# Patient Record
Sex: FEMALE | Race: WHITE | Employment: OTHER | URBAN - NONMETROPOLITAN AREA
[De-identification: names, ages, dates, MRNs, and addresses within clinical notes are randomized per-mention and may not be internally consistent; named-entity substitution may affect disease eponyms.]

---

## 2017-01-01 ENCOUNTER — APPOINTMENT (OUTPATIENT)
Dept: GENERAL RADIOLOGY | Age: 82
End: 2017-01-01
Payer: MEDICARE

## 2017-01-01 ENCOUNTER — CARE COORDINATION (OUTPATIENT)
Dept: CARE COORDINATION | Age: 82
End: 2017-01-01

## 2017-01-01 ENCOUNTER — CARE COORDINATION (OUTPATIENT)
Dept: FAMILY MEDICINE CLINIC | Age: 82
End: 2017-01-01

## 2017-01-01 ENCOUNTER — TELEPHONE (OUTPATIENT)
Dept: FAMILY MEDICINE CLINIC | Age: 82
End: 2017-01-01

## 2017-01-01 ENCOUNTER — NURSE ONLY (OUTPATIENT)
Dept: FAMILY MEDICINE CLINIC | Age: 82
End: 2017-01-01
Payer: MEDICARE

## 2017-01-01 ENCOUNTER — OFFICE VISIT (OUTPATIENT)
Dept: FAMILY MEDICINE CLINIC | Age: 82
End: 2017-01-01

## 2017-01-01 ENCOUNTER — APPOINTMENT (OUTPATIENT)
Dept: CT IMAGING | Age: 82
End: 2017-01-01
Payer: MEDICARE

## 2017-01-01 ENCOUNTER — HOSPITAL ENCOUNTER (EMERGENCY)
Age: 82
Discharge: HOME | End: 2017-08-31
Payer: MEDICARE

## 2017-01-01 ENCOUNTER — OFFICE VISIT (OUTPATIENT)
Dept: FAMILY MEDICINE CLINIC | Age: 82
End: 2017-01-01
Payer: MEDICARE

## 2017-01-01 VITALS
SYSTOLIC BLOOD PRESSURE: 134 MMHG | DIASTOLIC BLOOD PRESSURE: 78 MMHG | HEIGHT: 64 IN | HEART RATE: 72 BPM | BODY MASS INDEX: 27.11 KG/M2 | WEIGHT: 158.8 LBS

## 2017-01-01 VITALS
HEART RATE: 66 BPM | DIASTOLIC BLOOD PRESSURE: 64 MMHG | TEMPERATURE: 97.9 F | SYSTOLIC BLOOD PRESSURE: 127 MMHG | RESPIRATION RATE: 18 BRPM | OXYGEN SATURATION: 98 % | HEIGHT: 64 IN

## 2017-01-01 VITALS
HEIGHT: 63 IN | SYSTOLIC BLOOD PRESSURE: 126 MMHG | BODY MASS INDEX: 27.5 KG/M2 | WEIGHT: 155.2 LBS | HEART RATE: 72 BPM | DIASTOLIC BLOOD PRESSURE: 72 MMHG

## 2017-01-01 DIAGNOSIS — S70.02XA CONTUSION OF LEFT HIP, INITIAL ENCOUNTER: ICD-10-CM

## 2017-01-01 DIAGNOSIS — M48.02 CERVICAL SPINAL STENOSIS: ICD-10-CM
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
## 2017-01-01 DIAGNOSIS — Z23 INFLUENZA VACCINE NEEDED: ICD-10-CM

## 2017-01-01 DIAGNOSIS — I10 ESSENTIAL HYPERTENSION: Primary | ICD-10-CM

## 2017-01-01 DIAGNOSIS — I48.0 PAROXYSMAL ATRIAL FIBRILLATION (HCC): Primary | Chronic | ICD-10-CM

## 2017-01-01 DIAGNOSIS — Z79.01 LONG-TERM (CURRENT) USE OF ANTICOAGULANTS: ICD-10-CM

## 2017-01-01 DIAGNOSIS — I10 ESSENTIAL HYPERTENSION: ICD-10-CM

## 2017-01-01 DIAGNOSIS — J30.2 SEASONAL ALLERGIC RHINITIS, UNSPECIFIED ALLERGIC RHINITIS TRIGGER: Primary | Chronic | ICD-10-CM

## 2017-01-01 DIAGNOSIS — E78.00 PURE HYPERCHOLESTEROLEMIA: ICD-10-CM

## 2017-01-01 DIAGNOSIS — I48.0 PAROXYSMAL ATRIAL FIBRILLATION (HCC): Chronic | ICD-10-CM

## 2017-01-01 DIAGNOSIS — F33.42 RECURRENT MAJOR DEPRESSIVE DISORDER, IN FULL REMISSION (HCC): ICD-10-CM

## 2017-01-01 DIAGNOSIS — I48.0 PAROXYSMAL ATRIAL FIBRILLATION (HCC): Primary | ICD-10-CM

## 2017-01-01 DIAGNOSIS — Z79.01 ANTICOAGULATED ON COUMADIN: ICD-10-CM

## 2017-01-01 DIAGNOSIS — S51.002A AVULSION OF SKIN OF ELBOW, LEFT, INITIAL ENCOUNTER: ICD-10-CM

## 2017-01-01 DIAGNOSIS — I87.2 VENOUS STASIS DERMATITIS OF BOTH LOWER EXTREMITIES: ICD-10-CM

## 2017-01-01 DIAGNOSIS — F01.50 MID (MULTI INFARCT DEMENTIA), WITHOUT BEHAVIORAL DISTURBANCE (HCC): Chronic | ICD-10-CM

## 2017-01-01 LAB
EKG ATRIAL RATE: 326 BPM
EKG Q-T INTERVAL: 446 MS
EKG QRS DURATION: 76 MS
EKG QTC CALCULATION (BAZETT): 437 MS
EKG R AXIS: 2 DEGREES
EKG T AXIS: -7 DEGREES
EKG VENTRICULAR RATE: 58 BPM
INR BLD: 1
INR BLD: 1.6
INR BLD: 1.8
INR BLD: 2
INR BLD: 2.1
INR BLD: 2.2 (ref 0.85–1.13)
INR BLD: 2.38 (ref 0.85–1.13)
INR BLD: 2.38 (ref 0.85–1.13)
INR BLD: 3.5
INR BLD: 5.1
PROTHROMBIN TIME: 10.7 SEC
PROTHROMBIN TIME: 16.1 SEC
PROTHROMBIN TIME: 18.5 SEC
PROTHROMBIN TIME: 20.7 SEC
PROTHROMBIN TIME: 21.4 SEC
PROTHROMBIN TIME: 36.3 SEC
PROTHROMBIN TIME: 52.9 SEC

## 2017-01-01 PROCEDURE — 36415 COLL VENOUS BLD VENIPUNCTURE: CPT | Performed by: FAMILY MEDICINE

## 2017-01-01 PROCEDURE — 1090F PRES/ABSN URINE INCON ASSESS: CPT | Performed by: FAMILY MEDICINE

## 2017-01-01 PROCEDURE — G8420 CALC BMI NORM PARAMETERS: HCPCS | Performed by: FAMILY MEDICINE

## 2017-01-01 PROCEDURE — 1123F ACP DISCUSS/DSCN MKR DOCD: CPT | Performed by: FAMILY MEDICINE

## 2017-01-01 PROCEDURE — G8427 DOCREV CUR MEDS BY ELIG CLIN: HCPCS | Performed by: FAMILY MEDICINE

## 2017-01-01 PROCEDURE — 73080 X-RAY EXAM OF ELBOW: CPT

## 2017-01-01 PROCEDURE — 4040F PNEUMOC VAC/ADMIN/RCVD: CPT | Performed by: FAMILY MEDICINE

## 2017-01-01 PROCEDURE — 90688 IIV4 VACCINE SPLT 0.5 ML IM: CPT | Performed by: FAMILY MEDICINE

## 2017-01-01 PROCEDURE — G8484 FLU IMMUNIZE NO ADMIN: HCPCS | Performed by: FAMILY MEDICINE

## 2017-01-01 PROCEDURE — 99214 OFFICE O/P EST MOD 30 MIN: CPT | Performed by: FAMILY MEDICINE

## 2017-01-01 PROCEDURE — G8400 PT W/DXA NO RESULTS DOC: HCPCS | Performed by: FAMILY MEDICINE

## 2017-01-01 PROCEDURE — 70450 CT HEAD/BRAIN W/O DYE: CPT

## 2017-01-01 PROCEDURE — 99213 OFFICE O/P EST LOW 20 MIN: CPT | Performed by: FAMILY MEDICINE

## 2017-01-01 PROCEDURE — 73502 X-RAY EXAM HIP UNI 2-3 VIEWS: CPT

## 2017-01-01 PROCEDURE — 1036F TOBACCO NON-USER: CPT | Performed by: FAMILY MEDICINE

## 2017-01-01 PROCEDURE — 1111F DSCHRG MED/CURRENT MED MERGE: CPT | Performed by: FAMILY MEDICINE

## 2017-01-01 PROCEDURE — G0008 ADMIN INFLUENZA VIRUS VAC: HCPCS | Performed by: FAMILY MEDICINE

## 2017-01-01 PROCEDURE — 6370000000 HC RX 637 (ALT 250 FOR IP)

## 2017-01-01 PROCEDURE — G8419 CALC BMI OUT NRM PARAM NOF/U: HCPCS | Performed by: FAMILY MEDICINE

## 2017-01-01 PROCEDURE — 99284 EMERGENCY DEPT VISIT MOD MDM: CPT

## 2017-01-01 PROCEDURE — 93005 ELECTROCARDIOGRAM TRACING: CPT

## 2017-01-01 RX ORDER — WARFARIN SODIUM 2 MG/1
2 TABLET ORAL SEE ADMIN INSTRUCTIONS
Qty: 90 TABLET | Refills: 0 | Status: SHIPPED | OUTPATIENT
Start: 2017-01-01 | End: 2017-01-01 | Stop reason: SDUPTHER

## 2017-01-01 RX ORDER — WARFARIN SODIUM 1 MG/1
1 TABLET ORAL SEE ADMIN INSTRUCTIONS
Qty: 90 TABLET | Refills: 1 | Status: ON HOLD | OUTPATIENT
Start: 2017-01-01 | End: 2018-01-01

## 2017-01-01 RX ORDER — METOPROLOL TARTRATE 50 MG/1
25 TABLET, FILM COATED ORAL 2 TIMES DAILY
Qty: 90 TABLET | Refills: 1 | Status: ON HOLD | OUTPATIENT
Start: 2017-01-01 | End: 2018-01-01

## 2017-01-01 RX ORDER — WARFARIN SODIUM 1 MG/1
1 TABLET ORAL SEE ADMIN INSTRUCTIONS
Qty: 30 TABLET | Refills: 0 | Status: SHIPPED | OUTPATIENT
Start: 2017-01-01 | End: 2017-01-01 | Stop reason: SDUPTHER

## 2017-01-01 RX ORDER — WARFARIN SODIUM 2 MG/1
2 TABLET ORAL SEE ADMIN INSTRUCTIONS
Qty: 30 TABLET | Refills: 0 | Status: SHIPPED | OUTPATIENT
Start: 2017-01-01 | End: 2017-01-01 | Stop reason: SDUPTHER

## 2017-01-01 RX ORDER — LOXAPINE SUCCINATE 5 MG/1
5 TABLET ORAL NIGHTLY
Qty: 90 CAPSULE | Refills: 1 | Status: ON HOLD | OUTPATIENT
Start: 2017-01-01 | End: 2018-01-01

## 2017-01-01 RX ORDER — FLUTICASONE PROPIONATE 50 MCG
1 SPRAY, SUSPENSION (ML) NASAL NIGHTLY
Qty: 1 BOTTLE | Refills: 0 | Status: ON HOLD | OUTPATIENT
Start: 2017-01-01 | End: 2018-01-01

## 2017-01-01 RX ORDER — LOXAPINE SUCCINATE 5 MG/1
5 TABLET ORAL NIGHTLY
Qty: 90 CAPSULE | Refills: 1 | Status: SHIPPED | OUTPATIENT
Start: 2017-01-01 | End: 2017-01-01 | Stop reason: SDUPTHER

## 2017-01-01 RX ORDER — BENAZEPRIL HYDROCHLORIDE 40 MG/1
40 TABLET, FILM COATED ORAL DAILY
Qty: 90 TABLET | Refills: 1 | Status: SHIPPED | OUTPATIENT
Start: 2017-01-01 | End: 2017-01-01 | Stop reason: SDUPTHER

## 2017-01-01 RX ORDER — METOPROLOL TARTRATE 50 MG/1
25 TABLET, FILM COATED ORAL 2 TIMES DAILY
Qty: 90 TABLET | Refills: 1 | Status: SHIPPED | OUTPATIENT
Start: 2017-01-01 | End: 2017-01-01 | Stop reason: SDUPTHER

## 2017-01-01 RX ORDER — AMLODIPINE BESYLATE 5 MG/1
5 TABLET ORAL DAILY
Qty: 90 TABLET | Refills: 1 | Status: ON HOLD | OUTPATIENT
Start: 2017-01-01 | End: 2018-01-01

## 2017-01-01 RX ORDER — BENAZEPRIL HYDROCHLORIDE 40 MG/1
40 TABLET, FILM COATED ORAL DAILY
Qty: 90 TABLET | Refills: 1 | Status: ON HOLD | OUTPATIENT
Start: 2017-01-01 | End: 2018-01-01

## 2017-01-01 RX ORDER — WARFARIN SODIUM 1 MG/1
1 TABLET ORAL SEE ADMIN INSTRUCTIONS
Qty: 30 TABLET | Refills: 2 | Status: SHIPPED | OUTPATIENT
Start: 2017-01-01 | End: 2017-01-01 | Stop reason: SDUPTHER

## 2017-01-01 RX ORDER — WARFARIN SODIUM 2 MG/1
2 TABLET ORAL SEE ADMIN INSTRUCTIONS
Qty: 90 TABLET | Refills: 1 | Status: SHIPPED | OUTPATIENT
Start: 2017-01-01 | End: 2018-01-01 | Stop reason: SDUPTHER

## 2017-01-01 RX ORDER — FLUTICASONE PROPIONATE 50 MCG
1 SPRAY, SUSPENSION (ML) NASAL NIGHTLY
Qty: 1 BOTTLE | Refills: 0 | Status: SHIPPED | OUTPATIENT
Start: 2017-01-01 | End: 2017-01-01 | Stop reason: SDUPTHER

## 2017-01-01 RX ORDER — AMLODIPINE BESYLATE 5 MG/1
5 TABLET ORAL DAILY
Qty: 90 TABLET | Refills: 1 | Status: SHIPPED | OUTPATIENT
Start: 2017-01-01 | End: 2017-01-01 | Stop reason: SDUPTHER

## 2017-01-01 RX ORDER — BACITRACIN, NEOMYCIN, POLYMYXIN B 400; 3.5; 5 [USP'U]/G; MG/G; [USP'U]/G
OINTMENT TOPICAL
Status: COMPLETED
Start: 2017-01-01 | End: 2017-01-01

## 2017-01-01 RX ADMIN — NEOMYCIN AND POLYMYXIN B SULFATES AND BACITRACIN ZINC: 400; 3.5; 5 OINTMENT TOPICAL at 07:50

## 2017-01-01 ASSESSMENT — ENCOUNTER SYMPTOMS
BLOOD IN STOOL: 0
BACK PAIN: 0
COLOR CHANGE: 0
ABDOMINAL DISTENTION: 0
WHEEZING: 0
SINUS PRESSURE: 0
EYE REDNESS: 0
RHINORRHEA: 0
PHOTOPHOBIA: 0
VOICE CHANGE: 0
CHEST TIGHTNESS: 0
VOMITING: 0
DIARRHEA: 0
SHORTNESS OF BREATH: 0
NAUSEA: 0
SHORTNESS OF BREATH: 0
ABDOMINAL PAIN: 0
SORE THROAT: 0
COUGH: 0
CONSTIPATION: 0
WHEEZING: 0

## 2017-01-24 LAB
INR BLD: 2.4
PROTHROMBIN TIME: 25.1 SEC

## 2017-01-25 RX ORDER — WARFARIN SODIUM 1 MG/1
0.5 TABLET ORAL DAILY
Qty: 45 TABLET | Refills: 1 | Status: ON HOLD | OUTPATIENT
Start: 2017-01-25 | End: 2017-03-13

## 2017-01-26 ENCOUNTER — CARE COORDINATION (OUTPATIENT)
Dept: CARE COORDINATION | Age: 82
End: 2017-01-26

## 2017-02-23 ENCOUNTER — CARE COORDINATION (OUTPATIENT)
Dept: CARE COORDINATION | Age: 82
End: 2017-02-23

## 2017-02-28 LAB
INR BLD: 1.9
PROTHROMBIN TIME: 19.3 SEC

## 2017-03-12 PROBLEM — I95.9 HYPOTENSION: Status: ACTIVE | Noted: 2017-03-12

## 2017-03-12 PROBLEM — E87.1 HYPONATREMIA: Status: ACTIVE | Noted: 2017-03-12

## 2017-03-12 PROBLEM — R31.9 HEMATURIA: Status: ACTIVE | Noted: 2017-03-12

## 2017-03-12 PROBLEM — I48.92 ATRIAL FLUTTER (HCC): Status: ACTIVE | Noted: 2017-03-12

## 2017-03-12 PROBLEM — K85.90 ACUTE PANCREATITIS: Status: ACTIVE | Noted: 2017-03-12

## 2017-03-14 PROBLEM — I48.3 TYPICAL ATRIAL FLUTTER (HCC): Status: ACTIVE | Noted: 2017-03-12

## 2017-03-16 ENCOUNTER — CARE COORDINATION (OUTPATIENT)
Dept: CARE COORDINATION | Age: 82
End: 2017-03-16

## 2017-03-16 PROBLEM — I48.3 TYPICAL ATRIAL FLUTTER (HCC): Status: RESOLVED | Noted: 2017-03-12 | Resolved: 2017-03-16

## 2017-03-16 PROBLEM — E87.1 HYPONATREMIA: Status: RESOLVED | Noted: 2017-03-12 | Resolved: 2017-03-16

## 2017-03-16 PROBLEM — R31.9 HEMATURIA: Status: RESOLVED | Noted: 2017-03-12 | Resolved: 2017-03-16

## 2017-03-16 PROBLEM — I95.9 HYPOTENSION: Status: RESOLVED | Noted: 2017-03-12 | Resolved: 2017-03-16

## 2017-10-11 NOTE — PROGRESS NOTES
Frankie Ceron comes in for Nurse/CMA visit for seasonal flu vaccine. Administered in right deltoid by Octaviano Rosario LPN. Patient tolerated well. Advised to call office with any delayed reaction.
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
## 2018-01-01 ENCOUNTER — APPOINTMENT (OUTPATIENT)
Dept: GENERAL RADIOLOGY | Age: 83
DRG: 193 | End: 2018-01-01
Payer: MEDICARE

## 2018-01-01 ENCOUNTER — APPOINTMENT (OUTPATIENT)
Dept: MRI IMAGING | Age: 83
DRG: 871 | End: 2018-01-01
Payer: MEDICARE

## 2018-01-01 ENCOUNTER — OFFICE VISIT (OUTPATIENT)
Dept: FAMILY MEDICINE CLINIC | Age: 83
End: 2018-01-01
Payer: MEDICARE

## 2018-01-01 ENCOUNTER — APPOINTMENT (OUTPATIENT)
Dept: CT IMAGING | Age: 83
DRG: 193 | End: 2018-01-01
Payer: MEDICARE

## 2018-01-01 ENCOUNTER — HOSPITAL ENCOUNTER (OUTPATIENT)
Age: 83
Setting detail: SPECIMEN
Discharge: HOME | End: 2018-02-28
Payer: MEDICARE

## 2018-01-01 ENCOUNTER — CARE COORDINATION (OUTPATIENT)
Dept: CASE MANAGEMENT | Age: 83
End: 2018-01-01

## 2018-01-01 ENCOUNTER — HOSPITAL ENCOUNTER (INPATIENT)
Age: 83
LOS: 3 days | Discharge: HOSPICE | DRG: 871 | End: 2018-03-16
Attending: FAMILY MEDICINE | Admitting: HOSPITALIST
Payer: MEDICARE

## 2018-01-01 ENCOUNTER — APPOINTMENT (OUTPATIENT)
Dept: MRI IMAGING | Age: 83
DRG: 193 | End: 2018-01-01
Payer: MEDICARE

## 2018-01-01 ENCOUNTER — CARE COORDINATION (OUTPATIENT)
Dept: CARE COORDINATION | Age: 83
End: 2018-01-01

## 2018-01-01 ENCOUNTER — HOSPITAL ENCOUNTER (INPATIENT)
Age: 83
LOS: 5 days | Discharge: NURSING, REHAB OR LONG-TERM CARE | DRG: 193 | End: 2018-02-08
Attending: INTERNAL MEDICINE | Admitting: INTERNAL MEDICINE
Payer: MEDICARE

## 2018-01-01 ENCOUNTER — ANTI-COAG VISIT (OUTPATIENT)
Dept: FAMILY MEDICINE CLINIC | Age: 83
End: 2018-01-01
Payer: MEDICARE

## 2018-01-01 ENCOUNTER — HOSPITAL ENCOUNTER (OUTPATIENT)
Age: 83
End: 2018-01-01

## 2018-01-01 ENCOUNTER — NURSE ONLY (OUTPATIENT)
Dept: FAMILY MEDICINE CLINIC | Age: 83
End: 2018-01-01
Payer: MEDICARE

## 2018-01-01 ENCOUNTER — HOSPITAL ENCOUNTER (OUTPATIENT)
Age: 83
Setting detail: SPECIMEN
Discharge: HOME | End: 2018-03-05
Payer: MEDICARE

## 2018-01-01 ENCOUNTER — APPOINTMENT (OUTPATIENT)
Dept: CT IMAGING | Age: 83
DRG: 871 | End: 2018-01-01
Payer: MEDICARE

## 2018-01-01 ENCOUNTER — APPOINTMENT (OUTPATIENT)
Dept: GENERAL RADIOLOGY | Age: 83
DRG: 871 | End: 2018-01-01
Payer: MEDICARE

## 2018-01-01 ENCOUNTER — HOSPITAL ENCOUNTER (INPATIENT)
Age: 83
LOS: 1 days | DRG: 189 | End: 2018-03-17
Attending: FAMILY MEDICINE | Admitting: FAMILY MEDICINE
Payer: COMMERCIAL

## 2018-01-01 VITALS
WEIGHT: 154 LBS | DIASTOLIC BLOOD PRESSURE: 60 MMHG | SYSTOLIC BLOOD PRESSURE: 96 MMHG | BODY MASS INDEX: 26.29 KG/M2 | HEART RATE: 70 BPM | TEMPERATURE: 97.9 F | HEIGHT: 64 IN

## 2018-01-01 VITALS
HEIGHT: 63 IN | HEART RATE: 54 BPM | BODY MASS INDEX: 27.29 KG/M2 | DIASTOLIC BLOOD PRESSURE: 80 MMHG | SYSTOLIC BLOOD PRESSURE: 130 MMHG | WEIGHT: 154 LBS

## 2018-01-01 VITALS
RESPIRATION RATE: 24 BRPM | WEIGHT: 169.1 LBS | SYSTOLIC BLOOD PRESSURE: 129 MMHG | OXYGEN SATURATION: 92 % | BODY MASS INDEX: 29.96 KG/M2 | TEMPERATURE: 98.7 F | HEIGHT: 63 IN | DIASTOLIC BLOOD PRESSURE: 86 MMHG | HEART RATE: 103 BPM

## 2018-01-01 VITALS
TEMPERATURE: 99.6 F | DIASTOLIC BLOOD PRESSURE: 66 MMHG | BODY MASS INDEX: 29.96 KG/M2 | HEIGHT: 63 IN | SYSTOLIC BLOOD PRESSURE: 101 MMHG | HEART RATE: 104 BPM | OXYGEN SATURATION: 97 % | RESPIRATION RATE: 24 BRPM | WEIGHT: 169.1 LBS

## 2018-01-01 VITALS
RESPIRATION RATE: 18 BRPM | BODY MASS INDEX: 28.6 KG/M2 | WEIGHT: 161.4 LBS | OXYGEN SATURATION: 94 % | HEIGHT: 63 IN | HEART RATE: 72 BPM | SYSTOLIC BLOOD PRESSURE: 133 MMHG | TEMPERATURE: 98.8 F | DIASTOLIC BLOOD PRESSURE: 64 MMHG

## 2018-01-01 DIAGNOSIS — I48.0 PAROXYSMAL ATRIAL FIBRILLATION (HCC): ICD-10-CM

## 2018-01-01 DIAGNOSIS — G44.86 CERVICOGENIC HEADACHE: Primary | ICD-10-CM

## 2018-01-01 DIAGNOSIS — H70.91 MASTOIDITIS OF RIGHT SIDE: ICD-10-CM

## 2018-01-01 DIAGNOSIS — M62.81 ACUTE LEFT-SIDED MUSCLE WEAKNESS: ICD-10-CM

## 2018-01-01 DIAGNOSIS — Z79.01 LONG-TERM (CURRENT) USE OF ANTICOAGULANTS: ICD-10-CM

## 2018-01-01 DIAGNOSIS — I48.0 PAROXYSMAL ATRIAL FIBRILLATION (HCC): Chronic | ICD-10-CM

## 2018-01-01 DIAGNOSIS — R53.1 GENERALIZED WEAKNESS: ICD-10-CM

## 2018-01-01 DIAGNOSIS — R09.89 SUSPECTED CEREBROVASCULAR ACCIDENT (CVA): Primary | ICD-10-CM

## 2018-01-01 DIAGNOSIS — A41.9 SEPSIS, DUE TO UNSPECIFIED ORGANISM: ICD-10-CM

## 2018-01-01 DIAGNOSIS — J18.9 PNEUMONIA DUE TO ORGANISM: ICD-10-CM

## 2018-01-01 DIAGNOSIS — I48.0 PAROXYSMAL ATRIAL FIBRILLATION (HCC): Primary | Chronic | ICD-10-CM

## 2018-01-01 DIAGNOSIS — F01.50 VASCULAR DEMENTIA WITHOUT BEHAVIORAL DISTURBANCE (HCC): Chronic | ICD-10-CM

## 2018-01-01 DIAGNOSIS — R29.90 STROKE-LIKE SYMPTOMS: ICD-10-CM

## 2018-01-01 DIAGNOSIS — R51.9 ACUTE INTRACTABLE HEADACHE, UNSPECIFIED HEADACHE TYPE: Primary | ICD-10-CM

## 2018-01-01 DIAGNOSIS — M48.02 CERVICAL SPINAL STENOSIS: Chronic | ICD-10-CM

## 2018-01-01 DIAGNOSIS — R29.810 FACIAL DROOP: ICD-10-CM

## 2018-01-01 DIAGNOSIS — R11.2 NON-INTRACTABLE VOMITING WITH NAUSEA, UNSPECIFIED VOMITING TYPE: Primary | ICD-10-CM

## 2018-01-01 DIAGNOSIS — R51.9 ACUTE NONINTRACTABLE HEADACHE, UNSPECIFIED HEADACHE TYPE: ICD-10-CM

## 2018-01-01 DIAGNOSIS — D72.829 LEUKOCYTOSIS, UNSPECIFIED TYPE: ICD-10-CM

## 2018-01-01 LAB
ALBUMIN SERPL-MCNC: 3 G/DL (ref 3.5–5.1)
ALBUMIN SERPL-MCNC: 3.8 G/DL (ref 3.5–5.1)
ALLEN TEST: ABNORMAL
ALLEN TEST: POSITIVE
ALLEN TEST: POSITIVE
ALP BLD-CCNC: 151 U/L (ref 38–126)
ALP BLD-CCNC: 68 U/L (ref 38–126)
ALT SERPL-CCNC: 9 U/L (ref 11–66)
ALT SERPL-CCNC: 92 U/L (ref 11–66)
ANION GAP SERPL CALCULATED.3IONS-SCNC: 11 MEQ/L (ref 8–16)
ANION GAP SERPL CALCULATED.3IONS-SCNC: 12 MEQ/L (ref 8–16)
ANION GAP SERPL CALCULATED.3IONS-SCNC: 12 MEQ/L (ref 8–16)
ANION GAP SERPL CALCULATED.3IONS-SCNC: 13 MEQ/L (ref 8–16)
ANION GAP SERPL CALCULATED.3IONS-SCNC: 13 MEQ/L (ref 8–16)
ANION GAP SERPL CALCULATED.3IONS-SCNC: 15 MEQ/L (ref 8–16)
ANION GAP SERPL CALCULATED.3IONS-SCNC: 15 MEQ/L (ref 8–16)
ANION GAP SERPL CALCULATED.3IONS-SCNC: 19 MEQ/L (ref 8–16)
ANISOCYTOSIS: ABNORMAL
AST SERPL-CCNC: 119 U/L (ref 5–40)
AST SERPL-CCNC: 12 U/L (ref 5–40)
BACTERIA: ABNORMAL
BACTERIA: ABNORMAL /HPF
BACTERIA: ABNORMAL /HPF
BASE EXCESS (CALCULATED): -3.2 MMOL/L (ref -2.5–2.5)
BASE EXCESS (CALCULATED): -6.3 MMOL/L (ref -2.5–2.5)
BASE EXCESS (CALCULATED): -6.9 MMOL/L (ref -2.5–2.5)
BASOPHILS # BLD: 0 %
BASOPHILS # BLD: 0.1 %
BASOPHILS # BLD: 0.2 %
BASOPHILS # BLD: 0.3 %
BASOPHILS # BLD: 0.6 %
BASOPHILS ABSOLUTE: 0 THOU/MM3 (ref 0–0.1)
BILIRUB SERPL-MCNC: 0.5 MG/DL (ref 0.3–1.2)
BILIRUB SERPL-MCNC: 0.8 MG/DL (ref 0.3–1.2)
BILIRUBIN DIRECT: 0.3 MG/DL (ref 0–0.3)
BILIRUBIN URINE: NEGATIVE
BLOOD, URINE: ABNORMAL
BLOOD, URINE: ABNORMAL
BLOOD, URINE: NEGATIVE
BUN BLDV-MCNC: 14 MG/DL (ref 7–22)
BUN BLDV-MCNC: 15 MG/DL (ref 7–22)
BUN BLDV-MCNC: 16 MG/DL (ref 7–22)
BUN BLDV-MCNC: 16 MG/DL (ref 7–22)
BUN BLDV-MCNC: 17 MG/DL (ref 7–22)
BUN BLDV-MCNC: 18 MG/DL (ref 7–22)
BUN BLDV-MCNC: 20 MG/DL (ref 7–22)
BUN BLDV-MCNC: 21 MG/DL (ref 7–22)
C-REACTIVE PROTEIN: 5.38 MG/DL (ref 0–1)
CALCIUM SERPL-MCNC: 8.1 MG/DL (ref 8.5–10.5)
CALCIUM SERPL-MCNC: 8.2 MG/DL (ref 8.5–10.5)
CALCIUM SERPL-MCNC: 8.3 MG/DL (ref 8.5–10.5)
CALCIUM SERPL-MCNC: 8.3 MG/DL (ref 8.5–10.5)
CALCIUM SERPL-MCNC: 8.5 MG/DL (ref 8.5–10.5)
CALCIUM SERPL-MCNC: 8.5 MG/DL (ref 8.5–10.5)
CALCIUM SERPL-MCNC: 9.1 MG/DL (ref 8.5–10.5)
CALCIUM SERPL-MCNC: 9.3 MG/DL (ref 8.5–10.5)
CASTS 2: ABNORMAL /LPF
CASTS 2: ABNORMAL /LPF
CASTS UA: ABNORMAL /LPF
CASTS UA: ABNORMAL /LPF
CASTS: ABNORMAL /LPF
CASTS: ABNORMAL /LPF
CHARACTER, URINE: CLEAR
CHLORIDE BLD-SCNC: 100 MEQ/L (ref 98–111)
CHLORIDE BLD-SCNC: 102 MEQ/L (ref 98–111)
CHLORIDE BLD-SCNC: 89 MEQ/L (ref 98–111)
CHLORIDE BLD-SCNC: 94 MEQ/L (ref 98–111)
CHLORIDE BLD-SCNC: 94 MEQ/L (ref 98–111)
CHLORIDE BLD-SCNC: 95 MEQ/L (ref 98–111)
CHLORIDE BLD-SCNC: 97 MEQ/L (ref 98–111)
CHLORIDE BLD-SCNC: 98 MEQ/L (ref 98–111)
CHOLESTEROL, TOTAL: 98 MG/DL (ref 100–199)
CO2: 18 MEQ/L (ref 23–33)
CO2: 20 MEQ/L (ref 23–33)
CO2: 21 MEQ/L (ref 23–33)
CO2: 24 MEQ/L (ref 23–33)
CO2: 25 MEQ/L (ref 23–33)
CO2: 26 MEQ/L (ref 23–33)
CO2: 26 MEQ/L (ref 23–33)
CO2: 27 MEQ/L (ref 23–33)
COLLECTED BY:: ABNORMAL
COLOR: YELLOW
CREAT SERPL-MCNC: 0.6 MG/DL (ref 0.4–1.2)
CREAT SERPL-MCNC: 0.6 MG/DL (ref 0.4–1.2)
CREAT SERPL-MCNC: 0.7 MG/DL (ref 0.4–1.2)
CREAT SERPL-MCNC: 0.8 MG/DL (ref 0.4–1.2)
CRYSTALS, UA: ABNORMAL
CRYSTALS, UA: ABNORMAL
CRYSTALS: ABNORMAL
DEVICE: ABNORMAL
EKG ATRIAL RATE: 100 BPM
EKG ATRIAL RATE: 258 BPM
EKG ATRIAL RATE: 264 BPM
EKG ATRIAL RATE: 375 BPM
EKG Q-T INTERVAL: 346 MS
EKG Q-T INTERVAL: 368 MS
EKG Q-T INTERVAL: 400 MS
EKG Q-T INTERVAL: 418 MS
EKG QRS DURATION: 70 MS
EKG QRS DURATION: 76 MS
EKG QRS DURATION: 76 MS
EKG QRS DURATION: 82 MS
EKG QTC CALCULATION (BAZETT): 419 MS
EKG QTC CALCULATION (BAZETT): 456 MS
EKG QTC CALCULATION (BAZETT): 457 MS
EKG QTC CALCULATION (BAZETT): 466 MS
EKG R AXIS: -20 DEGREES
EKG R AXIS: -4 DEGREES
EKG R AXIS: -6 DEGREES
EKG T AXIS: -24 DEGREES
EKG T AXIS: -32 DEGREES
EKG T AXIS: -45 DEGREES
EKG T AXIS: 6 DEGREES
EKG VENTRICULAR RATE: 105 BPM
EKG VENTRICULAR RATE: 75 BPM
EKG VENTRICULAR RATE: 78 BPM
EKG VENTRICULAR RATE: 78 BPM
EOSINOPHIL # BLD: 0 %
EOSINOPHIL # BLD: 0.1 %
EOSINOPHIL # BLD: 1.8 %
EOSINOPHILS ABSOLUTE: 0 THOU/MM3 (ref 0–0.4)
EOSINOPHILS ABSOLUTE: 0.1 THOU/MM3 (ref 0–0.4)
EPITHELIAL CELLS, UA: ABNORMAL /HPF
FLU A ANTIGEN: NEGATIVE
FLU A ANTIGEN: NEGATIVE
FLU B ANTIGEN: NEGATIVE
FLU B ANTIGEN: NEGATIVE
FOLATE: 8.4 NG/ML (ref 4.8–24.2)
GFR SERPL CREATININE-BSD FRML MDRD: 68 ML/MIN/1.73M2
GFR SERPL CREATININE-BSD FRML MDRD: 79 ML/MIN/1.73M2
GFR SERPL CREATININE-BSD FRML MDRD: > 90 ML/MIN/1.73M2
GFR SERPL CREATININE-BSD FRML MDRD: > 90 ML/MIN/1.73M2
GLUCOSE BLD-MCNC: 100 MG/DL (ref 70–108)
GLUCOSE BLD-MCNC: 112 MG/DL (ref 70–108)
GLUCOSE BLD-MCNC: 120 MG/DL (ref 70–108)
GLUCOSE BLD-MCNC: 123 MG/DL (ref 70–108)
GLUCOSE BLD-MCNC: 131 MG/DL (ref 70–108)
GLUCOSE BLD-MCNC: 143 MG/DL (ref 70–108)
GLUCOSE BLD-MCNC: 153 MG/DL (ref 70–108)
GLUCOSE BLD-MCNC: 181 MG/DL (ref 70–108)
GLUCOSE BLD-MCNC: 191 MG/DL (ref 70–108)
GLUCOSE URINE: NEGATIVE MG/DL
GLUCOSE URINE: NEGATIVE MG/DL
GLUCOSE, URINE: NEGATIVE MG/DL
HCO3: 16 MMOL/L (ref 23–28)
HCO3: 21 MMOL/L (ref 23–28)
HCO3: 21 MMOL/L (ref 23–28)
HCT VFR BLD CALC: 35.4 % (ref 37–47)
HCT VFR BLD CALC: 36.9 % (ref 37–47)
HCT VFR BLD CALC: 37.9 % (ref 37–47)
HCT VFR BLD CALC: 38.7 % (ref 37–47)
HCT VFR BLD CALC: 42.5 % (ref 37–47)
HCT VFR BLD CALC: 43.7 % (ref 37–47)
HDLC SERPL-MCNC: 46 MG/DL
HEMOGLOBIN: 11.5 GM/DL (ref 12–16)
HEMOGLOBIN: 12.3 GM/DL (ref 12–16)
HEMOGLOBIN: 12.5 GM/DL (ref 12–16)
HEMOGLOBIN: 13.3 GM/DL (ref 12–16)
HEMOGLOBIN: 14.4 GM/DL (ref 12–16)
HEMOGLOBIN: 14.7 GM/DL (ref 12–16)
IFIO2: 100
IFIO2: 2
INR BLD: 1.63 (ref 0.85–1.13)
INR BLD: 1.76 (ref 0.85–1.13)
INR BLD: 1.97 (ref 0.85–1.13)
INR BLD: 2.32 (ref 0.85–1.13)
INR BLD: 2.34 (ref 0.85–1.13)
INR BLD: 2.48 (ref 0.85–1.13)
INR BLD: 2.51 (ref 0.85–1.13)
INR BLD: 2.81 (ref 0.85–1.13)
INR BLD: 3.01 (ref 0.85–1.13)
INR BLD: 3.02 (ref 0.85–1.13)
INR BLD: 3.04 (ref 0.85–1.13)
INR BLD: 4.25 (ref 0.85–1.13)
INR BLD: 6.88 (ref 0.85–1.13)
KETONES, URINE: NEGATIVE
LACTIC ACID: 2 MMOL/L (ref 0.5–2.2)
LACTIC ACID: 2.1 MMOL/L (ref 0.5–2.2)
LACTIC ACID: 2.4 MMOL/L (ref 0.5–2.2)
LACTIC ACID: 2.5 MMOL/L (ref 0.5–2.2)
LACTIC ACID: 4.4 MMOL/L (ref 0.5–2.2)
LDL CHOLESTEROL CALCULATED: 42 MG/DL
LEGIONELLA URINARY AG: NEGATIVE
LEUKOCYTE ESTERASE, URINE: ABNORMAL
LEUKOCYTE ESTERASE, URINE: NEGATIVE
LEUKOCYTE ESTERASE, URINE: NEGATIVE
LIPASE: 36.5 U/L (ref 5.6–51.3)
LIPASE: 44.1 U/L (ref 5.6–51.3)
LV EF: 59 %
LVEF MODALITY: NORMAL
LYMPHOCYTES # BLD: 2.5 %
LYMPHOCYTES # BLD: 3.6 %
LYMPHOCYTES # BLD: 5.8 %
LYMPHOCYTES # BLD: 7.9 %
LYMPHOCYTES # BLD: 9.4 %
LYMPHOCYTES ABSOLUTE: 0.4 THOU/MM3 (ref 1–4.8)
LYMPHOCYTES ABSOLUTE: 0.5 THOU/MM3 (ref 1–4.8)
LYMPHOCYTES ABSOLUTE: 0.6 THOU/MM3 (ref 1–4.8)
LYMPHOCYTES ABSOLUTE: 0.7 THOU/MM3 (ref 1–4.8)
LYMPHOCYTES ABSOLUTE: 0.8 THOU/MM3 (ref 1–4.8)
MAGNESIUM: 1.9 MG/DL (ref 1.6–2.4)
MAGNESIUM: 2 MG/DL (ref 1.6–2.4)
MCH RBC QN AUTO: 29.4 PG (ref 27–31)
MCH RBC QN AUTO: 29.4 PG (ref 27–31)
MCH RBC QN AUTO: 30.1 PG (ref 27–31)
MCH RBC QN AUTO: 30.5 PG (ref 27–31)
MCH RBC QN AUTO: 30.5 PG (ref 27–31)
MCH RBC QN AUTO: 30.7 PG (ref 27–31)
MCHC RBC AUTO-ENTMCNC: 32.3 GM/DL (ref 33–37)
MCHC RBC AUTO-ENTMCNC: 32.6 GM/DL (ref 33–37)
MCHC RBC AUTO-ENTMCNC: 33.7 GM/DL (ref 33–37)
MCHC RBC AUTO-ENTMCNC: 33.8 GM/DL (ref 33–37)
MCHC RBC AUTO-ENTMCNC: 34 GM/DL (ref 33–37)
MCHC RBC AUTO-ENTMCNC: 34.3 GM/DL (ref 33–37)
MCV RBC AUTO: 88.8 FL (ref 81–99)
MCV RBC AUTO: 89.6 FL (ref 81–99)
MCV RBC AUTO: 89.9 FL (ref 81–99)
MCV RBC AUTO: 90.1 FL (ref 81–99)
MCV RBC AUTO: 90.4 FL (ref 81–99)
MCV RBC AUTO: 90.8 FL (ref 81–99)
MISCELLANEOUS 2: ABNORMAL
MISCELLANEOUS 2: ABNORMAL
MISCELLANEOUS LAB TEST RESULT: ABNORMAL
MONOCYTES # BLD: 2.9 %
MONOCYTES # BLD: 3.6 %
MONOCYTES # BLD: 6.4 %
MONOCYTES # BLD: 6.5 %
MONOCYTES # BLD: 7.4 %
MONOCYTES ABSOLUTE: 0.4 THOU/MM3 (ref 0.4–1.3)
MONOCYTES ABSOLUTE: 0.5 THOU/MM3 (ref 0.4–1.3)
MONOCYTES ABSOLUTE: 0.6 THOU/MM3 (ref 0.4–1.3)
MONOCYTES ABSOLUTE: 0.7 THOU/MM3 (ref 0.4–1.3)
MONOCYTES ABSOLUTE: 0.8 THOU/MM3 (ref 0.4–1.3)
NITRITE, URINE: NEGATIVE
NUCLEATED RED BLOOD CELLS: 0 /100 WBC
O2 SATURATION: 87 %
O2 SATURATION: 91 %
O2 SATURATION: 98 %
ORGANISM: ABNORMAL
OSMOLALITY CALCULATION: 265.3 MOSMOL/KG (ref 275–300)
OSMOLALITY CALCULATION: 270.6 MOSMOL/KG (ref 275–300)
OSMOLALITY CALCULATION: 274.5 MOSMOL/KG (ref 275–300)
OSMOLALITY URINE: 588 MOSMOL/KG (ref 250–750)
PCO2: 25 MMHG (ref 35–45)
PCO2: 32 MMHG (ref 35–45)
PCO2: 48 MMHG (ref 35–45)
PDW BLD-RTO: 13.7 % (ref 11.5–14.5)
PDW BLD-RTO: 13.7 % (ref 11.5–14.5)
PDW BLD-RTO: 13.8 % (ref 11.5–14.5)
PDW BLD-RTO: 14.9 % (ref 11.5–14.5)
PDW BLD-RTO: 15.7 % (ref 11.5–14.5)
PDW BLD-RTO: 16.5 % (ref 11.5–14.5)
PH BLOOD GAS: 7.24 (ref 7.35–7.45)
PH BLOOD GAS: 7.41 (ref 7.35–7.45)
PH BLOOD GAS: 7.43 (ref 7.35–7.45)
PH UA: 6
PH UA: 6
PH UA: 6.5
PHOSPHORUS: 1.9 MG/DL (ref 2.4–4.7)
PLATELET # BLD: 153 THOU/MM3 (ref 130–400)
PLATELET # BLD: 196 THOU/MM3 (ref 130–400)
PLATELET # BLD: 207 THOU/MM3 (ref 130–400)
PLATELET # BLD: 212 THOU/MM3 (ref 130–400)
PLATELET # BLD: 240 THOU/MM3 (ref 130–400)
PLATELET # BLD: 252 THOU/MM3 (ref 130–400)
PMV BLD AUTO: 7.7 FL (ref 7.4–10.4)
PMV BLD AUTO: 7.9 FL (ref 7.4–10.4)
PMV BLD AUTO: 8 FL (ref 7.4–10.4)
PMV BLD AUTO: 8.1 FL (ref 7.4–10.4)
PMV BLD AUTO: 8.2 FL (ref 7.4–10.4)
PMV BLD AUTO: 8.7 FL (ref 7.4–10.4)
PO2: 60 MMHG (ref 71–104)
PO2: 63 MMHG (ref 71–104)
PO2: 96 MMHG (ref 71–104)
POTASSIUM REFLEX MAGNESIUM: 4.8 MEQ/L (ref 3.5–5.2)
POTASSIUM SERPL-SCNC: 3.6 MEQ/L (ref 3.5–5.2)
POTASSIUM SERPL-SCNC: 3.9 MEQ/L (ref 3.5–5.2)
POTASSIUM SERPL-SCNC: 4.2 MEQ/L (ref 3.5–5.2)
POTASSIUM SERPL-SCNC: 4.7 MEQ/L (ref 3.5–5.2)
POTASSIUM SERPL-SCNC: 4.8 MEQ/L (ref 3.5–5.2)
PRO-BNP: 2724 PG/ML (ref 0–1800)
PRO-BNP: ABNORMAL PG/ML (ref 0–1800)
PROCALCITONIN: 0.05 NG/ML (ref 0.01–0.09)
PROCALCITONIN: 0.06 NG/ML (ref 0.01–0.09)
PROCALCITONIN: 0.24 NG/ML (ref 0.01–0.09)
PROCALCITONIN: 0.6 NG/ML (ref 0.01–0.09)
PROTEIN UA: 100
PROTEIN UA: ABNORMAL
PROTEIN UA: ABNORMAL MG/DL
RBC # BLD: 3.93 MILL/MM3 (ref 4.2–5.4)
RBC # BLD: 4.11 MILL/MM3 (ref 4.2–5.4)
RBC # BLD: 4.17 MILL/MM3 (ref 4.2–5.4)
RBC # BLD: 4.32 MILL/MM3 (ref 4.2–5.4)
RBC # BLD: 4.78 MILL/MM3 (ref 4.2–5.4)
RBC # BLD: 4.83 MILL/MM3 (ref 4.2–5.4)
RBC URINE: ABNORMAL /HPF
RENAL EPITHELIAL, UA: ABNORMAL
SEDIMENTATION RATE, ERYTHROCYTE: 14 MM/HR (ref 0–20)
SEG NEUTROPHILS: 80.8 %
SEG NEUTROPHILS: 85.6 %
SEG NEUTROPHILS: 87.7 %
SEG NEUTROPHILS: 93.1 %
SEG NEUTROPHILS: 93.7 %
SEGMENTED NEUTROPHILS ABSOLUTE COUNT: 11 THOU/MM3 (ref 1.8–7.7)
SEGMENTED NEUTROPHILS ABSOLUTE COUNT: 12 THOU/MM3 (ref 1.8–7.7)
SEGMENTED NEUTROPHILS ABSOLUTE COUNT: 16.7 THOU/MM3 (ref 1.8–7.7)
SEGMENTED NEUTROPHILS ABSOLUTE COUNT: 5.6 THOU/MM3 (ref 1.8–7.7)
SEGMENTED NEUTROPHILS ABSOLUTE COUNT: 9 THOU/MM3 (ref 1.8–7.7)
SODIUM BLD-SCNC: 131 MEQ/L (ref 135–145)
SODIUM BLD-SCNC: 133 MEQ/L (ref 135–145)
SODIUM BLD-SCNC: 134 MEQ/L (ref 135–145)
SODIUM URINE: 55 MEQ/L
SOURCE, BLOOD GAS: ABNORMAL
SPECIFIC GRAVITY UA: 1.02 (ref 1–1.03)
SPECIFIC GRAVITY, URINE: 1.02 (ref 1–1.03)
SPECIFIC GRAVITY, URINE: 1.02 (ref 1–1.03)
STREP PNEUMO AG, UR: NEGATIVE
T4 FREE: 0.93 NG/DL (ref 0.93–1.76)
TOTAL PROTEIN: 6 G/DL (ref 6.1–8)
TOTAL PROTEIN: 7.1 G/DL (ref 6.1–8)
TRIGL SERPL-MCNC: 52 MG/DL (ref 0–199)
TROPONIN T: 0.11 NG/ML
TROPONIN T: 0.13 NG/ML
TROPONIN T: 0.15 NG/ML
TROPONIN T: 0.17 NG/ML
TROPONIN T: 0.18 NG/ML
TROPONIN T: 0.21 NG/ML
TROPONIN T: < 0.01 NG/ML
TSH SERPL DL<=0.05 MIU/L-ACNC: 0.34 UIU/ML (ref 0.4–4.2)
URINE CULTURE REFLEX: ABNORMAL
URINE CULTURE, ROUTINE: NORMAL
UROBILINOGEN, URINE: 0.2 EU/DL
UROBILINOGEN, URINE: 0.2 EU/DL
UROBILINOGEN, URINE: 1 EU/DL
VITAMIN B-12: 525 PG/ML (ref 211–911)
WBC # BLD: 10.2 THOU/MM3 (ref 4.8–10.8)
WBC # BLD: 10.5 THOU/MM3 (ref 4.8–10.8)
WBC # BLD: 12.5 THOU/MM3 (ref 4.8–10.8)
WBC # BLD: 12.9 THOU/MM3 (ref 4.8–10.8)
WBC # BLD: 17.8 THOU/MM3 (ref 4.8–10.8)
WBC # BLD: 6.9 THOU/MM3 (ref 4.8–10.8)
WBC UA: ABNORMAL /HPF
YEAST: ABNORMAL

## 2018-01-01 PROCEDURE — 2700000000 HC OXYGEN THERAPY PER DAY

## 2018-01-01 PROCEDURE — 97116 GAIT TRAINING THERAPY: CPT

## 2018-01-01 PROCEDURE — 93005 ELECTROCARDIOGRAM TRACING: CPT

## 2018-01-01 PROCEDURE — 96376 TX/PRO/DX INJ SAME DRUG ADON: CPT

## 2018-01-01 PROCEDURE — 6370000000 HC RX 637 (ALT 250 FOR IP): Performed by: INTERNAL MEDICINE

## 2018-01-01 PROCEDURE — 2580000003 HC RX 258: Performed by: INTERNAL MEDICINE

## 2018-01-01 PROCEDURE — 51798 US URINE CAPACITY MEASURE: CPT

## 2018-01-01 PROCEDURE — 85027 COMPLETE CBC AUTOMATED: CPT

## 2018-01-01 PROCEDURE — 96375 TX/PRO/DX INJ NEW DRUG ADDON: CPT

## 2018-01-01 PROCEDURE — 70450 CT HEAD/BRAIN W/O DYE: CPT

## 2018-01-01 PROCEDURE — 97110 THERAPEUTIC EXERCISES: CPT

## 2018-01-01 PROCEDURE — 6370000000 HC RX 637 (ALT 250 FOR IP): Performed by: HOSPITALIST

## 2018-01-01 PROCEDURE — 1090F PRES/ABSN URINE INCON ASSESS: CPT | Performed by: FAMILY MEDICINE

## 2018-01-01 PROCEDURE — 81001 URINALYSIS AUTO W/SCOPE: CPT

## 2018-01-01 PROCEDURE — 84484 ASSAY OF TROPONIN QUANT: CPT

## 2018-01-01 PROCEDURE — 99285 EMERGENCY DEPT VISIT HI MDM: CPT

## 2018-01-01 PROCEDURE — 92610 EVALUATE SWALLOWING FUNCTION: CPT

## 2018-01-01 PROCEDURE — 6370000000 HC RX 637 (ALT 250 FOR IP): Performed by: PHARMACIST

## 2018-01-01 PROCEDURE — 36415 COLL VENOUS BLD VENIPUNCTURE: CPT

## 2018-01-01 PROCEDURE — 6360000002 HC RX W HCPCS: Performed by: FAMILY MEDICINE

## 2018-01-01 PROCEDURE — 84439 ASSAY OF FREE THYROXINE: CPT

## 2018-01-01 PROCEDURE — 86140 C-REACTIVE PROTEIN: CPT

## 2018-01-01 PROCEDURE — 99221 1ST HOSP IP/OBS SF/LOW 40: CPT | Performed by: OTOLARYNGOLOGY

## 2018-01-01 PROCEDURE — 71046 X-RAY EXAM CHEST 2 VIEWS: CPT

## 2018-01-01 PROCEDURE — 99233 SBSQ HOSP IP/OBS HIGH 50: CPT | Performed by: INTERNAL MEDICINE

## 2018-01-01 PROCEDURE — 87804 INFLUENZA ASSAY W/OPTIC: CPT

## 2018-01-01 PROCEDURE — 6370000000 HC RX 637 (ALT 250 FOR IP): Performed by: NURSE PRACTITIONER

## 2018-01-01 PROCEDURE — 83735 ASSAY OF MAGNESIUM: CPT

## 2018-01-01 PROCEDURE — 6360000002 HC RX W HCPCS: Performed by: INTERNAL MEDICINE

## 2018-01-01 PROCEDURE — 94640 AIRWAY INHALATION TREATMENT: CPT

## 2018-01-01 PROCEDURE — 99215 OFFICE O/P EST HI 40 MIN: CPT | Performed by: FAMILY MEDICINE

## 2018-01-01 PROCEDURE — 1123F ACP DISCUSS/DSCN MKR DOCD: CPT | Performed by: FAMILY MEDICINE

## 2018-01-01 PROCEDURE — 6370000000 HC RX 637 (ALT 250 FOR IP): Performed by: FAMILY MEDICINE

## 2018-01-01 PROCEDURE — 85610 PROTHROMBIN TIME: CPT

## 2018-01-01 PROCEDURE — 51702 INSERT TEMP BLADDER CATH: CPT

## 2018-01-01 PROCEDURE — 6370000000 HC RX 637 (ALT 250 FOR IP)

## 2018-01-01 PROCEDURE — 94669 MECHANICAL CHEST WALL OSCILL: CPT

## 2018-01-01 PROCEDURE — 99222 1ST HOSP IP/OBS MODERATE 55: CPT | Performed by: PAIN MEDICINE

## 2018-01-01 PROCEDURE — 83880 ASSAY OF NATRIURETIC PEPTIDE: CPT

## 2018-01-01 PROCEDURE — 74177 CT ABD & PELVIS W/CONTRAST: CPT

## 2018-01-01 PROCEDURE — 2580000003 HC RX 258: Performed by: HOSPITALIST

## 2018-01-01 PROCEDURE — 85025 COMPLETE CBC W/AUTO DIFF WBC: CPT

## 2018-01-01 PROCEDURE — G8427 DOCREV CUR MEDS BY ELIG CLIN: HCPCS | Performed by: FAMILY MEDICINE

## 2018-01-01 PROCEDURE — 1200000003 HC TELEMETRY R&B

## 2018-01-01 PROCEDURE — G8482 FLU IMMUNIZE ORDER/ADMIN: HCPCS | Performed by: FAMILY MEDICINE

## 2018-01-01 PROCEDURE — G8598 ASA/ANTIPLAT THER USED: HCPCS | Performed by: FAMILY MEDICINE

## 2018-01-01 PROCEDURE — 2580000003 HC RX 258: Performed by: FAMILY MEDICINE

## 2018-01-01 PROCEDURE — 93793 ANTICOAG MGMT PT WARFARIN: CPT | Performed by: FAMILY MEDICINE

## 2018-01-01 PROCEDURE — 4040F PNEUMOC VAC/ADMIN/RCVD: CPT | Performed by: FAMILY MEDICINE

## 2018-01-01 PROCEDURE — G8419 CALC BMI OUT NRM PARAM NOF/U: HCPCS | Performed by: FAMILY MEDICINE

## 2018-01-01 PROCEDURE — 80061 LIPID PANEL: CPT

## 2018-01-01 PROCEDURE — 1210000002 HC MED SURG R&B - NEUROSCIENCE

## 2018-01-01 PROCEDURE — 99222 1ST HOSP IP/OBS MODERATE 55: CPT | Performed by: INTERNAL MEDICINE

## 2018-01-01 PROCEDURE — 80048 BASIC METABOLIC PNL TOTAL CA: CPT

## 2018-01-01 PROCEDURE — G0378 HOSPITAL OBSERVATION PER HR: HCPCS

## 2018-01-01 PROCEDURE — 93005 ELECTROCARDIOGRAM TRACING: CPT | Performed by: FAMILY MEDICINE

## 2018-01-01 PROCEDURE — 36600 WITHDRAWAL OF ARTERIAL BLOOD: CPT

## 2018-01-01 PROCEDURE — 97530 THERAPEUTIC ACTIVITIES: CPT

## 2018-01-01 PROCEDURE — 87086 URINE CULTURE/COLONY COUNT: CPT

## 2018-01-01 PROCEDURE — 36415 COLL VENOUS BLD VENIPUNCTURE: CPT | Performed by: FAMILY MEDICINE

## 2018-01-01 PROCEDURE — 1111F DSCHRG MED/CURRENT MED MERGE: CPT | Performed by: FAMILY MEDICINE

## 2018-01-01 PROCEDURE — 84100 ASSAY OF PHOSPHORUS: CPT

## 2018-01-01 PROCEDURE — 87899 AGENT NOS ASSAY W/OPTIC: CPT

## 2018-01-01 PROCEDURE — 6360000002 HC RX W HCPCS: Performed by: HOSPITALIST

## 2018-01-01 PROCEDURE — 1036F TOBACCO NON-USER: CPT | Performed by: FAMILY MEDICINE

## 2018-01-01 PROCEDURE — 99233 SBSQ HOSP IP/OBS HIGH 50: CPT | Performed by: FAMILY MEDICINE

## 2018-01-01 PROCEDURE — 2500000003 HC RX 250 WO HCPCS: Performed by: FAMILY MEDICINE

## 2018-01-01 PROCEDURE — 83605 ASSAY OF LACTIC ACID: CPT

## 2018-01-01 PROCEDURE — 70551 MRI BRAIN STEM W/O DYE: CPT

## 2018-01-01 PROCEDURE — 71045 X-RAY EXAM CHEST 1 VIEW: CPT

## 2018-01-01 PROCEDURE — S0028 INJECTION, FAMOTIDINE, 20 MG: HCPCS | Performed by: FAMILY MEDICINE

## 2018-01-01 PROCEDURE — 84300 ASSAY OF URINE SODIUM: CPT

## 2018-01-01 PROCEDURE — 84145 PROCALCITONIN (PCT): CPT

## 2018-01-01 PROCEDURE — 97161 PT EVAL LOW COMPLEX 20 MIN: CPT

## 2018-01-01 PROCEDURE — 99232 SBSQ HOSP IP/OBS MODERATE 35: CPT | Performed by: FAMILY MEDICINE

## 2018-01-01 PROCEDURE — 96372 THER/PROPH/DIAG INJ SC/IM: CPT

## 2018-01-01 PROCEDURE — 82746 ASSAY OF FOLIC ACID SERUM: CPT

## 2018-01-01 PROCEDURE — 82948 REAGENT STRIP/BLOOD GLUCOSE: CPT

## 2018-01-01 PROCEDURE — 80053 COMPREHEN METABOLIC PANEL: CPT

## 2018-01-01 PROCEDURE — G8987 SELF CARE CURRENT STATUS: HCPCS

## 2018-01-01 PROCEDURE — 85651 RBC SED RATE NONAUTOMATED: CPT

## 2018-01-01 PROCEDURE — 99222 1ST HOSP IP/OBS MODERATE 55: CPT | Performed by: PSYCHIATRY & NEUROLOGY

## 2018-01-01 PROCEDURE — 1200000000 HC SEMI PRIVATE

## 2018-01-01 PROCEDURE — G8978 MOBILITY CURRENT STATUS: HCPCS

## 2018-01-01 PROCEDURE — 99239 HOSP IP/OBS DSCHRG MGMT >30: CPT | Performed by: FAMILY MEDICINE

## 2018-01-01 PROCEDURE — 97166 OT EVAL MOD COMPLEX 45 MIN: CPT

## 2018-01-01 PROCEDURE — 2500000003 HC RX 250 WO HCPCS

## 2018-01-01 PROCEDURE — 87449 NOS EACH ORGANISM AG IA: CPT

## 2018-01-01 PROCEDURE — 72141 MRI NECK SPINE W/O DYE: CPT

## 2018-01-01 PROCEDURE — 6360000002 HC RX W HCPCS

## 2018-01-01 PROCEDURE — 93306 TTE W/DOPPLER COMPLETE: CPT

## 2018-01-01 PROCEDURE — 93005 ELECTROCARDIOGRAM TRACING: CPT | Performed by: NURSE PRACTITIONER

## 2018-01-01 PROCEDURE — 83690 ASSAY OF LIPASE: CPT

## 2018-01-01 PROCEDURE — G8979 MOBILITY GOAL STATUS: HCPCS

## 2018-01-01 PROCEDURE — 2580000003 HC RX 258: Performed by: NURSE PRACTITIONER

## 2018-01-01 PROCEDURE — 99291 CRITICAL CARE FIRST HOUR: CPT | Performed by: INTERNAL MEDICINE

## 2018-01-01 PROCEDURE — 87040 BLOOD CULTURE FOR BACTERIA: CPT

## 2018-01-01 PROCEDURE — 99238 HOSP IP/OBS DSCHRG MGMT 30/<: CPT | Performed by: FAMILY MEDICINE

## 2018-01-01 PROCEDURE — 82248 BILIRUBIN DIRECT: CPT

## 2018-01-01 PROCEDURE — 97535 SELF CARE MNGMENT TRAINING: CPT

## 2018-01-01 PROCEDURE — 74018 RADEX ABDOMEN 1 VIEW: CPT

## 2018-01-01 PROCEDURE — 82803 BLOOD GASES ANY COMBINATION: CPT

## 2018-01-01 PROCEDURE — 97162 PT EVAL MOD COMPLEX 30 MIN: CPT

## 2018-01-01 PROCEDURE — 83935 ASSAY OF URINE OSMOLALITY: CPT

## 2018-01-01 PROCEDURE — 96374 THER/PROPH/DIAG INJ IV PUSH: CPT

## 2018-01-01 PROCEDURE — G8988 SELF CARE GOAL STATUS: HCPCS

## 2018-01-01 PROCEDURE — G8484 FLU IMMUNIZE NO ADMIN: HCPCS | Performed by: FAMILY MEDICINE

## 2018-01-01 PROCEDURE — 6360000004 HC RX CONTRAST MEDICATION: Performed by: FAMILY MEDICINE

## 2018-01-01 PROCEDURE — 99213 OFFICE O/P EST LOW 20 MIN: CPT | Performed by: FAMILY MEDICINE

## 2018-01-01 PROCEDURE — 82607 VITAMIN B-12: CPT

## 2018-01-01 PROCEDURE — 99232 SBSQ HOSP IP/OBS MODERATE 35: CPT | Performed by: PAIN MEDICINE

## 2018-01-01 PROCEDURE — 84443 ASSAY THYROID STIM HORMONE: CPT

## 2018-01-01 PROCEDURE — 6360000002 HC RX W HCPCS: Performed by: NURSE PRACTITIONER

## 2018-01-01 PROCEDURE — 95819 EEG AWAKE AND ASLEEP: CPT

## 2018-01-01 PROCEDURE — 99222 1ST HOSP IP/OBS MODERATE 55: CPT | Performed by: HOSPITALIST

## 2018-01-01 RX ORDER — 0.9 % SODIUM CHLORIDE 0.9 %
1000 INTRAVENOUS SOLUTION INTRAVENOUS ONCE
Status: COMPLETED | OUTPATIENT
Start: 2018-01-01 | End: 2018-01-01

## 2018-01-01 RX ORDER — SENNA PLUS 8.6 MG/1
2 TABLET ORAL NIGHTLY
Qty: 60 TABLET | Refills: 0 | DISCHARGE
Start: 2018-01-01 | End: 2018-01-01

## 2018-01-01 RX ORDER — DOCUSATE SODIUM 100 MG/1
100 CAPSULE, LIQUID FILLED ORAL 2 TIMES DAILY
Status: DISCONTINUED | OUTPATIENT
Start: 2018-01-01 | End: 2018-01-01

## 2018-01-01 RX ORDER — SCOLOPAMINE TRANSDERMAL SYSTEM 1 MG/1
1 PATCH, EXTENDED RELEASE TRANSDERMAL
Status: DISCONTINUED | OUTPATIENT
Start: 2018-01-01 | End: 2018-01-01 | Stop reason: HOSPADM

## 2018-01-01 RX ORDER — SODIUM CHLORIDE 0.9 % (FLUSH) 0.9 %
10 SYRINGE (ML) INJECTION EVERY 12 HOURS SCHEDULED
Status: DISCONTINUED | OUTPATIENT
Start: 2018-01-01 | End: 2018-01-01

## 2018-01-01 RX ORDER — ACETAMINOPHEN 650 MG/1
650 SUPPOSITORY RECTAL ONCE
Status: COMPLETED | OUTPATIENT
Start: 2018-01-01 | End: 2018-01-01

## 2018-01-01 RX ORDER — SODIUM CHLORIDE 0.9 % (FLUSH) 0.9 %
10 SYRINGE (ML) INJECTION PRN
Status: DISCONTINUED | OUTPATIENT
Start: 2018-01-01 | End: 2018-01-01 | Stop reason: HOSPADM

## 2018-01-01 RX ORDER — WARFARIN SODIUM 2 MG/1
2 TABLET ORAL SEE ADMIN INSTRUCTIONS
Qty: 90 TABLET | Refills: 1 | Status: ON HOLD | OUTPATIENT
Start: 2018-01-01 | End: 2018-01-01

## 2018-01-01 RX ORDER — ACETAMINOPHEN 325 MG/1
650 TABLET ORAL EVERY 4 HOURS PRN
Status: DISCONTINUED | OUTPATIENT
Start: 2018-01-01 | End: 2018-01-01 | Stop reason: HOSPADM

## 2018-01-01 RX ORDER — ONDANSETRON 2 MG/ML
4 INJECTION INTRAMUSCULAR; INTRAVENOUS EVERY 6 HOURS PRN
Status: DISCONTINUED | OUTPATIENT
Start: 2018-01-01 | End: 2018-01-01 | Stop reason: HOSPADM

## 2018-01-01 RX ORDER — SODIUM CHLORIDE 0.9 % (FLUSH) 0.9 %
10 SYRINGE (ML) INJECTION EVERY 12 HOURS SCHEDULED
Status: DISCONTINUED | OUTPATIENT
Start: 2018-01-01 | End: 2018-01-01 | Stop reason: SDUPTHER

## 2018-01-01 RX ORDER — ONDANSETRON 2 MG/ML
4 INJECTION INTRAMUSCULAR; INTRAVENOUS ONCE
Status: COMPLETED | OUTPATIENT
Start: 2018-01-01 | End: 2018-01-01

## 2018-01-01 RX ORDER — BISACODYL 10 MG
10 SUPPOSITORY, RECTAL RECTAL PRN
Status: DISCONTINUED | OUTPATIENT
Start: 2018-01-01 | End: 2018-01-01 | Stop reason: HOSPADM

## 2018-01-01 RX ORDER — LORAZEPAM 2 MG/ML
0.5 INJECTION INTRAMUSCULAR EVERY 30 MIN PRN
Status: DISCONTINUED | OUTPATIENT
Start: 2018-01-01 | End: 2018-01-01 | Stop reason: HOSPADM

## 2018-01-01 RX ORDER — FUROSEMIDE 10 MG/ML
20 INJECTION INTRAMUSCULAR; INTRAVENOUS ONCE
Status: COMPLETED | OUTPATIENT
Start: 2018-01-01 | End: 2018-01-01

## 2018-01-01 RX ORDER — WARFARIN SODIUM 2.5 MG/1
2.5 TABLET ORAL ONCE
Status: COMPLETED | OUTPATIENT
Start: 2018-01-01 | End: 2018-01-01

## 2018-01-01 RX ORDER — LOXAPINE SUCCINATE 5 MG/1
5 TABLET ORAL NIGHTLY
Status: DISCONTINUED | OUTPATIENT
Start: 2018-01-01 | End: 2018-01-01 | Stop reason: HOSPADM

## 2018-01-01 RX ORDER — OXYCODONE HYDROCHLORIDE 5 MG/1
5 TABLET ORAL EVERY 4 HOURS PRN
Status: DISCONTINUED | OUTPATIENT
Start: 2018-01-01 | End: 2018-01-01

## 2018-01-01 RX ORDER — WARFARIN SODIUM 4 MG/1
4 TABLET ORAL ONCE
Status: COMPLETED | OUTPATIENT
Start: 2018-01-01 | End: 2018-01-01

## 2018-01-01 RX ORDER — LISINOPRIL 40 MG/1
40 TABLET ORAL DAILY
Status: DISCONTINUED | OUTPATIENT
Start: 2018-01-01 | End: 2018-01-01

## 2018-01-01 RX ORDER — SODIUM CHLORIDE 9 MG/ML
INJECTION, SOLUTION INTRAVENOUS ONCE
Status: COMPLETED | OUTPATIENT
Start: 2018-01-01 | End: 2018-01-01

## 2018-01-01 RX ORDER — SODIUM CHLORIDE 9 MG/ML
INJECTION, SOLUTION INTRAVENOUS CONTINUOUS
Status: DISCONTINUED | OUTPATIENT
Start: 2018-01-01 | End: 2018-01-01 | Stop reason: HOSPADM

## 2018-01-01 RX ORDER — ACETAMINOPHEN 650 MG/1
650 SUPPOSITORY RECTAL EVERY 4 HOURS PRN
Status: DISCONTINUED | OUTPATIENT
Start: 2018-01-01 | End: 2018-01-01 | Stop reason: HOSPADM

## 2018-01-01 RX ORDER — FLUTICASONE PROPIONATE 50 MCG
1 SPRAY, SUSPENSION (ML) NASAL NIGHTLY
Status: DISCONTINUED | OUTPATIENT
Start: 2018-01-01 | End: 2018-01-01

## 2018-01-01 RX ORDER — GLYCOPYRROLATE 1 MG/5 ML
0.2 SYRINGE (ML) INTRAVENOUS
Status: CANCELLED | OUTPATIENT
Start: 2018-01-01

## 2018-01-01 RX ORDER — FUROSEMIDE 10 MG/ML
INJECTION INTRAMUSCULAR; INTRAVENOUS
Status: DISCONTINUED
Start: 2018-01-01 | End: 2018-01-01 | Stop reason: HOSPADM

## 2018-01-01 RX ORDER — CEFTRIAXONE 1 G/1
INJECTION, POWDER, FOR SOLUTION INTRAMUSCULAR; INTRAVENOUS
Status: DISPENSED
Start: 2018-01-01 | End: 2018-01-01

## 2018-01-01 RX ORDER — POLYETHYLENE GLYCOL 3350 17 G/17G
17 POWDER, FOR SOLUTION ORAL DAILY PRN
Qty: 527 G | Refills: 1 | DISCHARGE
Start: 2018-01-01 | End: 2018-01-01

## 2018-01-01 RX ORDER — SODIUM CHLORIDE 0.9 % (FLUSH) 0.9 %
10 SYRINGE (ML) INJECTION EVERY 12 HOURS SCHEDULED
Status: DISCONTINUED | OUTPATIENT
Start: 2018-01-01 | End: 2018-01-01 | Stop reason: HOSPADM

## 2018-01-01 RX ORDER — VANCOMYCIN HYDROCHLORIDE 1 G/200ML
1000 INJECTION, SOLUTION INTRAVENOUS EVERY 24 HOURS
Status: DISCONTINUED | OUTPATIENT
Start: 2018-01-01 | End: 2018-01-01

## 2018-01-01 RX ORDER — ACETAMINOPHEN 325 MG/1
650 TABLET ORAL EVERY 4 HOURS PRN
Status: CANCELLED | OUTPATIENT
Start: 2018-01-01

## 2018-01-01 RX ORDER — VANCOMYCIN HYDROCHLORIDE 1 G/200ML
1000 INJECTION, SOLUTION INTRAVENOUS ONCE
Status: COMPLETED | OUTPATIENT
Start: 2018-01-01 | End: 2018-01-01

## 2018-01-01 RX ORDER — SODIUM CHLORIDE 9 MG/ML
INJECTION, SOLUTION INTRAVENOUS CONTINUOUS
Status: CANCELLED | OUTPATIENT
Start: 2018-01-01

## 2018-01-01 RX ORDER — METOCLOPRAMIDE HYDROCHLORIDE 5 MG/ML
10 INJECTION INTRAMUSCULAR; INTRAVENOUS ONCE
Status: COMPLETED | OUTPATIENT
Start: 2018-01-01 | End: 2018-01-01

## 2018-01-01 RX ORDER — WARFARIN SODIUM 3 MG/1
3 TABLET ORAL
Status: COMPLETED | OUTPATIENT
Start: 2018-01-01 | End: 2018-01-01

## 2018-01-01 RX ORDER — AZITHROMYCIN 250 MG/1
250 TABLET, FILM COATED ORAL DAILY
Qty: 4 TABLET | Refills: 0 | DISCHARGE
Start: 2018-01-01 | End: 2018-01-01

## 2018-01-01 RX ORDER — FUROSEMIDE 10 MG/ML
40 INJECTION INTRAMUSCULAR; INTRAVENOUS ONCE
Status: COMPLETED | OUTPATIENT
Start: 2018-01-01 | End: 2018-01-01

## 2018-01-01 RX ORDER — POLYETHYLENE GLYCOL 3350 17 G/17G
17 POWDER, FOR SOLUTION ORAL DAILY
Status: DISCONTINUED | OUTPATIENT
Start: 2018-01-01 | End: 2018-01-01 | Stop reason: HOSPADM

## 2018-01-01 RX ORDER — PROMETHAZINE HYDROCHLORIDE 25 MG/ML
6.25 INJECTION, SOLUTION INTRAMUSCULAR; INTRAVENOUS ONCE
Status: COMPLETED | OUTPATIENT
Start: 2018-01-01 | End: 2018-01-01

## 2018-01-01 RX ORDER — ONDANSETRON 2 MG/ML
4 INJECTION INTRAMUSCULAR; INTRAVENOUS EVERY 6 HOURS PRN
Status: DISCONTINUED | OUTPATIENT
Start: 2018-01-01 | End: 2018-01-01 | Stop reason: SDUPTHER

## 2018-01-01 RX ORDER — ACETAMINOPHEN 325 MG/1
650 TABLET ORAL EVERY 4 HOURS PRN
Status: DISCONTINUED | OUTPATIENT
Start: 2018-01-01 | End: 2018-01-01 | Stop reason: SDUPTHER

## 2018-01-01 RX ORDER — MORPHINE SULFATE/PF 50 MG/50ML
PATIENT CONTROLLED ANALGESIA SYRINGE INTRAVENOUS CONTINUOUS
Status: CANCELLED | OUTPATIENT
Start: 2018-01-01

## 2018-01-01 RX ORDER — 0.9 % SODIUM CHLORIDE 0.9 %
250 INTRAVENOUS SOLUTION INTRAVENOUS ONCE
Status: COMPLETED | OUTPATIENT
Start: 2018-01-01 | End: 2018-01-01

## 2018-01-01 RX ORDER — SODIUM CHLORIDE 0.9 % (FLUSH) 0.9 %
10 SYRINGE (ML) INJECTION PRN
Status: DISCONTINUED | OUTPATIENT
Start: 2018-01-01 | End: 2018-01-01

## 2018-01-01 RX ORDER — METOPROLOL TARTRATE 5 MG/5ML
INJECTION INTRAVENOUS
Status: COMPLETED
Start: 2018-01-01 | End: 2018-01-01

## 2018-01-01 RX ORDER — BISACODYL 10 MG
10 SUPPOSITORY, RECTAL RECTAL DAILY
Status: COMPLETED | OUTPATIENT
Start: 2018-01-01 | End: 2018-01-01

## 2018-01-01 RX ORDER — DIPHENHYDRAMINE HCL 12.5MG/5ML
12.5 LIQUID (ML) ORAL ONCE
Status: COMPLETED | OUTPATIENT
Start: 2018-01-01 | End: 2018-01-01

## 2018-01-01 RX ORDER — ACETAMINOPHEN 650 MG/1
650 SUPPOSITORY RECTAL EVERY 4 HOURS PRN
Status: CANCELLED | OUTPATIENT
Start: 2018-01-01

## 2018-01-01 RX ORDER — AMLODIPINE BESYLATE 5 MG/1
5 TABLET ORAL DAILY
Status: DISCONTINUED | OUTPATIENT
Start: 2018-01-01 | End: 2018-01-01

## 2018-01-01 RX ORDER — SIMVASTATIN 20 MG
20 TABLET ORAL NIGHTLY
Status: DISCONTINUED | OUTPATIENT
Start: 2018-01-01 | End: 2018-01-01

## 2018-01-01 RX ORDER — METOPROLOL TARTRATE 5 MG/5ML
5 INJECTION INTRAVENOUS ONCE
Status: DISCONTINUED | OUTPATIENT
Start: 2018-01-01 | End: 2018-01-01

## 2018-01-01 RX ORDER — MORPHINE SULFATE 2 MG/ML
2 INJECTION, SOLUTION INTRAMUSCULAR; INTRAVENOUS
Status: DISCONTINUED | OUTPATIENT
Start: 2018-01-01 | End: 2018-01-01

## 2018-01-01 RX ORDER — HYDROCODONE BITARTRATE AND ACETAMINOPHEN 5; 325 MG/1; MG/1
1 TABLET ORAL EVERY 6 HOURS PRN
Status: DISCONTINUED | OUTPATIENT
Start: 2018-01-01 | End: 2018-01-01

## 2018-01-01 RX ORDER — GLYCOPYRROLATE 1 MG/5 ML
0.2 SYRINGE (ML) INTRAVENOUS
Status: DISCONTINUED | OUTPATIENT
Start: 2018-01-01 | End: 2018-01-01 | Stop reason: HOSPADM

## 2018-01-01 RX ORDER — POTASSIUM CHLORIDE 20MEQ/15ML
40 LIQUID (ML) ORAL PRN
Status: DISCONTINUED | OUTPATIENT
Start: 2018-01-01 | End: 2018-01-01

## 2018-01-01 RX ORDER — ASPIRIN 81 MG/1
81 TABLET ORAL DAILY
Status: DISCONTINUED | OUTPATIENT
Start: 2018-01-01 | End: 2018-01-01

## 2018-01-01 RX ORDER — FLUTICASONE PROPIONATE 50 MCG
1 SPRAY, SUSPENSION (ML) NASAL NIGHTLY
Status: DISCONTINUED | OUTPATIENT
Start: 2018-01-01 | End: 2018-01-01 | Stop reason: HOSPADM

## 2018-01-01 RX ORDER — BUTALBITAL, ACETAMINOPHEN AND CAFFEINE 50; 325; 40 MG/1; MG/1; MG/1
1 TABLET ORAL EVERY 6 HOURS PRN
Status: DISCONTINUED | OUTPATIENT
Start: 2018-01-01 | End: 2018-01-01 | Stop reason: HOSPADM

## 2018-01-01 RX ORDER — POLYETHYLENE GLYCOL 3350 17 G/17G
17 POWDER, FOR SOLUTION ORAL DAILY
Status: DISCONTINUED | OUTPATIENT
Start: 2018-01-01 | End: 2018-01-01

## 2018-01-01 RX ORDER — SODIUM CHLORIDE 9 MG/ML
INJECTION, SOLUTION INTRAVENOUS CONTINUOUS
Status: ACTIVE | OUTPATIENT
Start: 2018-01-01 | End: 2018-01-01

## 2018-01-01 RX ORDER — 0.9 % SODIUM CHLORIDE 0.9 %
1000 INTRAVENOUS SOLUTION INTRAVENOUS ONCE
Status: DISCONTINUED | OUTPATIENT
Start: 2018-01-01 | End: 2018-01-01

## 2018-01-01 RX ORDER — MORPHINE SULFATE 2 MG/ML
INJECTION, SOLUTION INTRAMUSCULAR; INTRAVENOUS
Status: COMPLETED
Start: 2018-01-01 | End: 2018-01-01

## 2018-01-01 RX ORDER — LOXAPINE SUCCINATE 5 MG/1
5 TABLET ORAL NIGHTLY
Status: DISCONTINUED | OUTPATIENT
Start: 2018-01-01 | End: 2018-01-01

## 2018-01-01 RX ORDER — LIDOCAINE HYDROCHLORIDE 40 MG/ML
1 INJECTION, SOLUTION RETROBULBAR; TOPICAL 4 TIMES DAILY PRN
Status: DISCONTINUED | OUTPATIENT
Start: 2018-01-01 | End: 2018-01-01 | Stop reason: HOSPADM

## 2018-01-01 RX ORDER — LIDOCAINE HYDROCHLORIDE 20 MG/ML
5 INJECTION, SOLUTION INFILTRATION; PERINEURAL 4 TIMES DAILY PRN
Status: DISCONTINUED | OUTPATIENT
Start: 2018-01-01 | End: 2018-01-01 | Stop reason: ALTCHOICE

## 2018-01-01 RX ORDER — DOCUSATE SODIUM 100 MG/1
100 CAPSULE, LIQUID FILLED ORAL 2 TIMES DAILY
Status: DISCONTINUED | OUTPATIENT
Start: 2018-01-01 | End: 2018-01-01 | Stop reason: HOSPADM

## 2018-01-01 RX ORDER — ONDANSETRON 2 MG/ML
4 INJECTION INTRAMUSCULAR; INTRAVENOUS EVERY 6 HOURS PRN
Status: CANCELLED | OUTPATIENT
Start: 2018-01-01

## 2018-01-01 RX ORDER — AMLODIPINE BESYLATE 5 MG/1
5 TABLET ORAL DAILY
Status: DISCONTINUED | OUTPATIENT
Start: 2018-01-01 | End: 2018-01-01 | Stop reason: HOSPADM

## 2018-01-01 RX ORDER — IPRATROPIUM BROMIDE AND ALBUTEROL SULFATE 2.5; .5 MG/3ML; MG/3ML
1 SOLUTION RESPIRATORY (INHALATION)
Status: DISCONTINUED | OUTPATIENT
Start: 2018-01-01 | End: 2018-01-01

## 2018-01-01 RX ORDER — AZITHROMYCIN 250 MG/1
250 TABLET, FILM COATED ORAL DAILY
Status: DISCONTINUED | OUTPATIENT
Start: 2018-01-01 | End: 2018-01-01 | Stop reason: HOSPADM

## 2018-01-01 RX ORDER — BUTALBITAL, ACETAMINOPHEN AND CAFFEINE 50; 325; 40 MG/1; MG/1; MG/1
1 TABLET ORAL EVERY 4 HOURS PRN
Status: DISCONTINUED | OUTPATIENT
Start: 2018-01-01 | End: 2018-01-01

## 2018-01-01 RX ORDER — SODIUM CHLORIDE 0.9 % (FLUSH) 0.9 %
10 SYRINGE (ML) INJECTION PRN
Status: CANCELLED | OUTPATIENT
Start: 2018-01-01

## 2018-01-01 RX ORDER — MORPHINE SULFATE 2 MG/ML
2 INJECTION, SOLUTION INTRAMUSCULAR; INTRAVENOUS
Status: DISCONTINUED | OUTPATIENT
Start: 2018-01-01 | End: 2018-01-01 | Stop reason: HOSPADM

## 2018-01-01 RX ORDER — WARFARIN SODIUM 1 MG/1
1 TABLET ORAL ONCE
Status: COMPLETED | OUTPATIENT
Start: 2018-01-01 | End: 2018-01-01

## 2018-01-01 RX ORDER — MORPHINE SULFATE/PF 50 MG/50ML
PATIENT CONTROLLED ANALGESIA SYRINGE INTRAVENOUS CONTINUOUS
Status: DISCONTINUED | OUTPATIENT
Start: 2018-01-01 | End: 2018-01-01 | Stop reason: HOSPADM

## 2018-01-01 RX ORDER — 0.9 % SODIUM CHLORIDE 0.9 %
250 INTRAVENOUS SOLUTION INTRAVENOUS ONCE
Status: DISCONTINUED | OUTPATIENT
Start: 2018-01-01 | End: 2018-01-01 | Stop reason: HOSPADM

## 2018-01-01 RX ORDER — LORAZEPAM 2 MG/ML
INJECTION INTRAMUSCULAR
Status: COMPLETED
Start: 2018-01-01 | End: 2018-01-01

## 2018-01-01 RX ORDER — POTASSIUM CHLORIDE 7.45 MG/ML
10 INJECTION INTRAVENOUS PRN
Status: DISCONTINUED | OUTPATIENT
Start: 2018-01-01 | End: 2018-01-01

## 2018-01-01 RX ORDER — POTASSIUM CHLORIDE 20 MEQ/1
40 TABLET, EXTENDED RELEASE ORAL PRN
Status: DISCONTINUED | OUTPATIENT
Start: 2018-01-01 | End: 2018-01-01

## 2018-01-01 RX ORDER — SODIUM CHLORIDE 9 MG/ML
INJECTION, SOLUTION INTRAVENOUS CONTINUOUS
Status: DISCONTINUED | OUTPATIENT
Start: 2018-01-01 | End: 2018-01-01

## 2018-01-01 RX ORDER — LISINOPRIL 40 MG/1
40 TABLET ORAL DAILY
Status: DISCONTINUED | OUTPATIENT
Start: 2018-01-01 | End: 2018-01-01 | Stop reason: HOSPADM

## 2018-01-01 RX ORDER — BISACODYL 10 MG
10 SUPPOSITORY, RECTAL RECTAL PRN
DISCHARGE
Start: 2018-01-01 | End: 2018-01-01

## 2018-01-01 RX ORDER — DOCUSATE SODIUM 100 MG/1
100 CAPSULE, LIQUID FILLED ORAL 2 TIMES DAILY PRN
Status: DISCONTINUED | OUTPATIENT
Start: 2018-01-01 | End: 2018-01-01 | Stop reason: HOSPADM

## 2018-01-01 RX ORDER — 0.9 % SODIUM CHLORIDE 0.9 %
VIAL (ML) INJECTION
Status: DISPENSED
Start: 2018-01-01 | End: 2018-01-01

## 2018-01-01 RX ORDER — DIGOXIN 0.25 MG/ML
INJECTION INTRAMUSCULAR; INTRAVENOUS
Status: COMPLETED
Start: 2018-01-01 | End: 2018-01-01

## 2018-01-01 RX ORDER — LORAZEPAM 2 MG/ML
0.5 INJECTION INTRAMUSCULAR EVERY 30 MIN PRN
Status: CANCELLED | OUTPATIENT
Start: 2018-01-01

## 2018-01-01 RX ORDER — SENNA PLUS 8.6 MG/1
2 TABLET ORAL NIGHTLY
Status: DISCONTINUED | OUTPATIENT
Start: 2018-01-01 | End: 2018-01-01 | Stop reason: HOSPADM

## 2018-01-01 RX ORDER — PSEUDOEPHEDRINE HCL 30 MG
100 TABLET ORAL 2 TIMES DAILY
Status: ON HOLD | DISCHARGE
Start: 2018-01-01 | End: 2018-01-01 | Stop reason: ALTCHOICE

## 2018-01-01 RX ADMIN — SENNA 17.2 MG: 8.6 TABLET, COATED ORAL at 21:04

## 2018-01-01 RX ADMIN — FLUTICASONE PROPIONATE 1 SPRAY: 50 SPRAY, METERED NASAL at 23:42

## 2018-01-01 RX ADMIN — Medication 10 ML: at 21:03

## 2018-01-01 RX ADMIN — AMLODIPINE BESYLATE 5 MG: 5 TABLET ORAL at 09:04

## 2018-01-01 RX ADMIN — Medication 10 ML: at 21:04

## 2018-01-01 RX ADMIN — SODIUM CHLORIDE: 9 INJECTION, SOLUTION INTRAVENOUS at 23:34

## 2018-01-01 RX ADMIN — ONDANSETRON 4 MG: 2 INJECTION INTRAMUSCULAR; INTRAVENOUS at 12:54

## 2018-01-01 RX ADMIN — Medication 3 G: at 05:49

## 2018-01-01 RX ADMIN — FUROSEMIDE 20 MG: 10 INJECTION, SOLUTION INTRAMUSCULAR; INTRAVENOUS at 06:20

## 2018-01-01 RX ADMIN — Medication 10 ML: at 21:02

## 2018-01-01 RX ADMIN — WARFARIN SODIUM 2.5 MG: 2.5 TABLET ORAL at 14:45

## 2018-01-01 RX ADMIN — LOXAPINE 5 MG: 5 CAPSULE ORAL at 21:04

## 2018-01-01 RX ADMIN — Medication: at 06:32

## 2018-01-01 RX ADMIN — PROMETHAZINE HYDROCHLORIDE 6.25 MG: 25 INJECTION INTRAMUSCULAR; INTRAVENOUS at 18:39

## 2018-01-01 RX ADMIN — FLUTICASONE PROPIONATE 1 SPRAY: 50 SPRAY, METERED NASAL at 21:04

## 2018-01-01 RX ADMIN — Medication 3 G: at 12:42

## 2018-01-01 RX ADMIN — DOCUSATE SODIUM 100 MG: 100 CAPSULE, LIQUID FILLED ORAL at 09:44

## 2018-01-01 RX ADMIN — PIPERACILLIN SODIUM,TAZOBACTAM SODIUM 3.38 G: 3; .375 INJECTION, POWDER, FOR SOLUTION INTRAVENOUS at 16:58

## 2018-01-01 RX ADMIN — Medication 10 ML: at 11:51

## 2018-01-01 RX ADMIN — PIPERACILLIN SODIUM,TAZOBACTAM SODIUM 3.38 G: 3; .375 INJECTION, POWDER, FOR SOLUTION INTRAVENOUS at 23:42

## 2018-01-01 RX ADMIN — IPRATROPIUM BROMIDE AND ALBUTEROL SULFATE 1 AMPULE: .5; 3 SOLUTION RESPIRATORY (INHALATION) at 21:32

## 2018-01-01 RX ADMIN — LOXAPINE 5 MG: 5 CAPSULE ORAL at 21:57

## 2018-01-01 RX ADMIN — SERTRALINE 50 MG: 50 TABLET, FILM COATED ORAL at 10:09

## 2018-01-01 RX ADMIN — METOPROLOL TARTRATE 25 MG: 25 TABLET ORAL at 09:44

## 2018-01-01 RX ADMIN — POLYETHYLENE GLYCOL 3350 17 G: 17 POWDER, FOR SOLUTION ORAL at 09:59

## 2018-01-01 RX ADMIN — WARFARIN SODIUM 2.5 MG: 2.5 TABLET ORAL at 16:43

## 2018-01-01 RX ADMIN — LISINOPRIL 40 MG: 40 TABLET ORAL at 12:03

## 2018-01-01 RX ADMIN — PIPERACILLIN SODIUM,TAZOBACTAM SODIUM 3.38 G: 3; .375 INJECTION, POWDER, FOR SOLUTION INTRAVENOUS at 15:44

## 2018-01-01 RX ADMIN — WARFARIN SODIUM 4 MG: 4 TABLET ORAL at 23:42

## 2018-01-01 RX ADMIN — FLUTICASONE PROPIONATE 1 SPRAY: 50 SPRAY, METERED NASAL at 21:57

## 2018-01-01 RX ADMIN — ACETAMINOPHEN 650 MG: 325 TABLET ORAL at 23:42

## 2018-01-01 RX ADMIN — MORPHINE SULFATE: 2 INJECTION, SOLUTION INTRAMUSCULAR; INTRAVENOUS at 06:08

## 2018-01-01 RX ADMIN — Medication 3 G: at 06:08

## 2018-01-01 RX ADMIN — DOCUSATE SODIUM 100 MG: 100 CAPSULE, LIQUID FILLED ORAL at 21:04

## 2018-01-01 RX ADMIN — AMLODIPINE BESYLATE 5 MG: 5 TABLET ORAL at 10:14

## 2018-01-01 RX ADMIN — SODIUM CHLORIDE: 9 INJECTION, SOLUTION INTRAVENOUS at 00:09

## 2018-01-01 RX ADMIN — WARFARIN SODIUM 2.5 MG: 2.5 TABLET ORAL at 17:18

## 2018-01-01 RX ADMIN — ACETAMINOPHEN 650 MG: 325 TABLET ORAL at 01:56

## 2018-01-01 RX ADMIN — LOXAPINE 5 MG: 5 CAPSULE ORAL at 19:43

## 2018-01-01 RX ADMIN — Medication 3 G: at 07:45

## 2018-01-01 RX ADMIN — SODIUM CHLORIDE: 9 INJECTION, SOLUTION INTRAVENOUS at 10:04

## 2018-01-01 RX ADMIN — DOCUSATE SODIUM 100 MG: 100 CAPSULE ORAL at 10:09

## 2018-01-01 RX ADMIN — DIGOXIN: 0.25 INJECTION INTRAMUSCULAR; INTRAVENOUS at 06:27

## 2018-01-01 RX ADMIN — METOPROLOL TARTRATE 25 MG: 25 TABLET ORAL at 20:58

## 2018-01-01 RX ADMIN — IPRATROPIUM BROMIDE AND ALBUTEROL SULFATE 1 AMPULE: .5; 3 SOLUTION RESPIRATORY (INHALATION) at 17:27

## 2018-01-01 RX ADMIN — Medication 3 G: at 18:00

## 2018-01-01 RX ADMIN — FLUTICASONE PROPIONATE 1 SPRAY: 50 SPRAY, METERED NASAL at 19:43

## 2018-01-01 RX ADMIN — METOPROLOL TARTRATE 25 MG: 25 TABLET ORAL at 21:02

## 2018-01-01 RX ADMIN — AMLODIPINE BESYLATE 5 MG: 5 TABLET ORAL at 09:45

## 2018-01-01 RX ADMIN — PIPERACILLIN SODIUM,TAZOBACTAM SODIUM 3.38 G: 3; .375 INJECTION, POWDER, FOR SOLUTION INTRAVENOUS at 06:41

## 2018-01-01 RX ADMIN — SERTRALINE 50 MG: 50 TABLET, FILM COATED ORAL at 10:00

## 2018-01-01 RX ADMIN — PIPERACILLIN SODIUM,TAZOBACTAM SODIUM 3.38 G: 3; .375 INJECTION, POWDER, FOR SOLUTION INTRAVENOUS at 06:15

## 2018-01-01 RX ADMIN — POLYETHYLENE GLYCOL 3350 17 G: 17 POWDER, FOR SOLUTION ORAL at 21:03

## 2018-01-01 RX ADMIN — FLUTICASONE PROPIONATE 1 SPRAY: 50 SPRAY, METERED NASAL at 21:00

## 2018-01-01 RX ADMIN — DOCUSATE SODIUM 100 MG: 100 CAPSULE, LIQUID FILLED ORAL at 21:10

## 2018-01-01 RX ADMIN — Medication 10 ML: at 21:57

## 2018-01-01 RX ADMIN — Medication 10 ML: at 08:36

## 2018-01-01 RX ADMIN — AMLODIPINE BESYLATE 5 MG: 5 TABLET ORAL at 08:34

## 2018-01-01 RX ADMIN — SODIUM CHLORIDE 1000 ML: 9 INJECTION, SOLUTION INTRAVENOUS at 00:30

## 2018-01-01 RX ADMIN — SIMVASTATIN 20 MG: 20 TABLET, FILM COATED ORAL at 19:43

## 2018-01-01 RX ADMIN — METOPROLOL TARTRATE 25 MG: 25 TABLET ORAL at 10:14

## 2018-01-01 RX ADMIN — Medication 0.2 MG: at 09:17

## 2018-01-01 RX ADMIN — ASPIRIN 81 MG: 81 TABLET ORAL at 09:26

## 2018-01-01 RX ADMIN — Medication 3 G: at 18:26

## 2018-01-01 RX ADMIN — SIMVASTATIN 20 MG: 20 TABLET, FILM COATED ORAL at 23:42

## 2018-01-01 RX ADMIN — DOCUSATE SODIUM 100 MG: 100 CAPSULE ORAL at 09:26

## 2018-01-01 RX ADMIN — LOXAPINE 5 MG: 5 CAPSULE ORAL at 01:55

## 2018-01-01 RX ADMIN — BUTALBITAL, ACETAMINOPHEN, AND CAFFEINE 1 TABLET: 50; 325; 40 TABLET ORAL at 20:25

## 2018-01-01 RX ADMIN — ACETAMINOPHEN 650 MG: 325 TABLET ORAL at 00:03

## 2018-01-01 RX ADMIN — ONDANSETRON 4 MG: 2 INJECTION INTRAMUSCULAR; INTRAVENOUS at 17:37

## 2018-01-01 RX ADMIN — DOCUSATE SODIUM 100 MG: 100 CAPSULE, LIQUID FILLED ORAL at 21:57

## 2018-01-01 RX ADMIN — LISINOPRIL 40 MG: 40 TABLET ORAL at 09:44

## 2018-01-01 RX ADMIN — POLYETHYLENE GLYCOL 3350 17 G: 17 POWDER, FOR SOLUTION ORAL at 09:45

## 2018-01-01 RX ADMIN — ONDANSETRON 4 MG: 2 INJECTION INTRAMUSCULAR; INTRAVENOUS at 05:40

## 2018-01-01 RX ADMIN — DOCUSATE SODIUM 100 MG: 100 CAPSULE ORAL at 19:43

## 2018-01-01 RX ADMIN — SODIUM CHLORIDE 1000 ML: 9 INJECTION, SOLUTION INTRAVENOUS at 17:12

## 2018-01-01 RX ADMIN — Medication 0.2 MG: at 14:28

## 2018-01-01 RX ADMIN — BUTALBITAL, ACETAMINOPHEN, AND CAFFEINE 1 TABLET: 50; 325; 40 TABLET ORAL at 20:59

## 2018-01-01 RX ADMIN — PIPERACILLIN SODIUM,TAZOBACTAM SODIUM 3.38 G: 3; .375 INJECTION, POWDER, FOR SOLUTION INTRAVENOUS at 23:34

## 2018-01-01 RX ADMIN — PIPERACILLIN SODIUM,TAZOBACTAM SODIUM 3.38 G: 3; .375 INJECTION, POWDER, FOR SOLUTION INTRAVENOUS at 06:55

## 2018-01-01 RX ADMIN — SODIUM CHLORIDE: 9 INJECTION, SOLUTION INTRAVENOUS at 14:23

## 2018-01-01 RX ADMIN — FUROSEMIDE 20 MG: 10 INJECTION, SOLUTION INTRAMUSCULAR; INTRAVENOUS at 20:07

## 2018-01-01 RX ADMIN — Medication 10 ML: at 22:13

## 2018-01-01 RX ADMIN — DOCUSATE SODIUM 100 MG: 100 CAPSULE, LIQUID FILLED ORAL at 09:04

## 2018-01-01 RX ADMIN — AZITHROMYCIN 250 MG: 250 TABLET, FILM COATED ORAL at 12:03

## 2018-01-01 RX ADMIN — Medication 3 G: at 00:26

## 2018-01-01 RX ADMIN — BISACODYL 10 MG: 10 SUPPOSITORY RECTAL at 16:30

## 2018-01-01 RX ADMIN — WARFARIN SODIUM 3 MG: 3 TABLET ORAL at 19:44

## 2018-01-01 RX ADMIN — ACETAMINOPHEN 650 MG: 325 TABLET ORAL at 06:41

## 2018-01-01 RX ADMIN — ONDANSETRON 4 MG: 2 INJECTION INTRAMUSCULAR; INTRAVENOUS at 16:42

## 2018-01-01 RX ADMIN — LORAZEPAM 0.5 MG: 2 INJECTION, SOLUTION INTRAMUSCULAR; INTRAVENOUS at 09:17

## 2018-01-01 RX ADMIN — SENNA 17.2 MG: 8.6 TABLET, COATED ORAL at 21:57

## 2018-01-01 RX ADMIN — Medication 3 G: at 01:01

## 2018-01-01 RX ADMIN — POLYETHYLENE GLYCOL 3350 17 G: 17 POWDER, FOR SOLUTION ORAL at 09:04

## 2018-01-01 RX ADMIN — SODIUM CHLORIDE: 9 INJECTION, SOLUTION INTRAVENOUS at 06:22

## 2018-01-01 RX ADMIN — Medication 0.2 MG: at 11:51

## 2018-01-01 RX ADMIN — ONDANSETRON 4 MG: 2 INJECTION INTRAMUSCULAR; INTRAVENOUS at 22:11

## 2018-01-01 RX ADMIN — METOCLOPRAMIDE 10 MG: 5 INJECTION, SOLUTION INTRAMUSCULAR; INTRAVENOUS at 11:03

## 2018-01-01 RX ADMIN — ONDANSETRON 4 MG: 2 INJECTION INTRAMUSCULAR; INTRAVENOUS at 16:59

## 2018-01-01 RX ADMIN — DOCUSATE SODIUM 100 MG: 100 CAPSULE ORAL at 23:42

## 2018-01-01 RX ADMIN — LOXAPINE 5 MG: 5 CAPSULE ORAL at 20:58

## 2018-01-01 RX ADMIN — Medication 3 G: at 12:31

## 2018-01-01 RX ADMIN — SERTRALINE 50 MG: 50 TABLET, FILM COATED ORAL at 10:14

## 2018-01-01 RX ADMIN — VANCOMYCIN HYDROCHLORIDE 1000 MG: 1 INJECTION, SOLUTION INTRAVENOUS at 21:35

## 2018-01-01 RX ADMIN — Medication 3 G: at 12:03

## 2018-01-01 RX ADMIN — POLYETHYLENE GLYCOL 3350 17 G: 17 POWDER, FOR SOLUTION ORAL at 16:36

## 2018-01-01 RX ADMIN — Medication 3 G: at 05:30

## 2018-01-01 RX ADMIN — Medication 10 ML: at 11:32

## 2018-01-01 RX ADMIN — MORPHINE SULFATE: 2 INJECTION, SOLUTION INTRAMUSCULAR; INTRAVENOUS at 06:22

## 2018-01-01 RX ADMIN — IOPAMIDOL 80 ML: 755 INJECTION, SOLUTION INTRAVENOUS at 19:00

## 2018-01-01 RX ADMIN — DOCUSATE SODIUM 100 MG: 100 CAPSULE, LIQUID FILLED ORAL at 10:00

## 2018-01-01 RX ADMIN — METOPROLOL TARTRATE 25 MG: 25 TABLET ORAL at 21:57

## 2018-01-01 RX ADMIN — Medication 3 G: at 13:37

## 2018-01-01 RX ADMIN — Medication 3 G: at 11:31

## 2018-01-01 RX ADMIN — SODIUM CHLORIDE 1.5 G: 900 INJECTION, SOLUTION INTRAVENOUS at 23:24

## 2018-01-01 RX ADMIN — SODIUM CHLORIDE 1000 ML: 9 INJECTION, SOLUTION INTRAVENOUS at 07:45

## 2018-01-01 RX ADMIN — Medication 10 ML: at 18:58

## 2018-01-01 RX ADMIN — SODIUM CHLORIDE: 9 INJECTION, SOLUTION INTRAVENOUS at 23:24

## 2018-01-01 RX ADMIN — METOPROLOL TARTRATE: 5 INJECTION, SOLUTION INTRAVENOUS at 06:08

## 2018-01-01 RX ADMIN — ONDANSETRON 4 MG: 2 INJECTION INTRAMUSCULAR; INTRAVENOUS at 13:46

## 2018-01-01 RX ADMIN — AZITHROMYCIN 250 MG: 250 TABLET, FILM COATED ORAL at 09:45

## 2018-01-01 RX ADMIN — Medication 0.5 MG: at 18:01

## 2018-01-01 RX ADMIN — CEFEPIME HYDROCHLORIDE 2 G: 2 INJECTION, POWDER, FOR SOLUTION INTRAVENOUS at 19:44

## 2018-01-01 RX ADMIN — Medication 10 ML: at 09:59

## 2018-01-01 RX ADMIN — FAMOTIDINE 20 MG: 10 INJECTION, SOLUTION INTRAVENOUS at 17:36

## 2018-01-01 RX ADMIN — MORPHINE SULFATE: 1 INJECTION INTRAVENOUS at 11:45

## 2018-01-01 RX ADMIN — AZITHROMYCIN 250 MG: 250 TABLET, FILM COATED ORAL at 17:17

## 2018-01-01 RX ADMIN — LISINOPRIL 40 MG: 40 TABLET ORAL at 08:35

## 2018-01-01 RX ADMIN — DIPHENHYDRAMINE HYDROCHLORIDE 12.5 MG: 12.5 SOLUTION ORAL at 20:25

## 2018-01-01 RX ADMIN — HYDROCORTISONE SODIUM SUCCINATE 100 MG: 100 INJECTION, POWDER, FOR SOLUTION INTRAMUSCULAR; INTRAVENOUS at 06:38

## 2018-01-01 RX ADMIN — SERTRALINE 50 MG: 50 TABLET, FILM COATED ORAL at 09:04

## 2018-01-01 RX ADMIN — PIPERACILLIN SODIUM,TAZOBACTAM SODIUM 3.38 G: 3; .375 INJECTION, POWDER, FOR SOLUTION INTRAVENOUS at 23:07

## 2018-01-01 RX ADMIN — METOPROLOL TARTRATE 25 MG: 25 TABLET ORAL at 21:04

## 2018-01-01 RX ADMIN — SODIUM CHLORIDE: 9 INJECTION, SOLUTION INTRAVENOUS at 02:10

## 2018-01-01 RX ADMIN — METOPROLOL TARTRATE 25 MG: 25 TABLET ORAL at 10:00

## 2018-01-01 RX ADMIN — AMLODIPINE BESYLATE 5 MG: 5 TABLET ORAL at 10:00

## 2018-01-01 RX ADMIN — ENOXAPARIN SODIUM 40 MG: 40 INJECTION SUBCUTANEOUS at 10:06

## 2018-01-01 RX ADMIN — SODIUM CHLORIDE: 9 INJECTION, SOLUTION INTRAVENOUS at 21:38

## 2018-01-01 RX ADMIN — ENOXAPARIN SODIUM 40 MG: 40 INJECTION SUBCUTANEOUS at 11:36

## 2018-01-01 RX ADMIN — Medication 3 G: at 23:57

## 2018-01-01 RX ADMIN — VANCOMYCIN HYDROCHLORIDE 1000 MG: 1 INJECTION, SOLUTION INTRAVENOUS at 19:53

## 2018-01-01 RX ADMIN — SERTRALINE 50 MG: 50 TABLET, FILM COATED ORAL at 08:34

## 2018-01-01 RX ADMIN — WARFARIN SODIUM 1 MG: 1 TABLET ORAL at 17:17

## 2018-01-01 RX ADMIN — ACETAMINOPHEN 650 MG: 325 TABLET ORAL at 04:47

## 2018-01-01 RX ADMIN — Medication 3 G: at 19:04

## 2018-01-01 RX ADMIN — WATER 1 G: 1 INJECTION INTRAMUSCULAR; INTRAVENOUS; SUBCUTANEOUS at 21:59

## 2018-01-01 RX ADMIN — SERTRALINE 50 MG: 50 TABLET, FILM COATED ORAL at 08:07

## 2018-01-01 RX ADMIN — FUROSEMIDE 40 MG: 10 INJECTION, SOLUTION INTRAVENOUS at 06:09

## 2018-01-01 RX ADMIN — SODIUM CHLORIDE 1.5 G: 900 INJECTION, SOLUTION INTRAVENOUS at 06:21

## 2018-01-01 RX ADMIN — LISINOPRIL 40 MG: 40 TABLET ORAL at 10:01

## 2018-01-01 RX ADMIN — BUTALBITAL, ACETAMINOPHEN, AND CAFFEINE 1 TABLET: 50; 325; 40 TABLET ORAL at 10:14

## 2018-01-01 RX ADMIN — LOXAPINE 5 MG: 5 CAPSULE ORAL at 21:03

## 2018-01-01 RX ADMIN — IPRATROPIUM BROMIDE 0.5 MG: 0.5 SOLUTION RESPIRATORY (INHALATION) at 06:23

## 2018-01-01 RX ADMIN — Medication 3 G: at 01:44

## 2018-01-01 RX ADMIN — Medication 10 ML: at 09:05

## 2018-01-01 RX ADMIN — ACETAMINOPHEN 650 MG: 650 SUPPOSITORY RECTAL at 17:13

## 2018-01-01 RX ADMIN — METOPROLOL TARTRATE 25 MG: 25 TABLET ORAL at 09:04

## 2018-01-01 RX ADMIN — FLUTICASONE PROPIONATE 1 SPRAY: 50 SPRAY, METERED NASAL at 20:16

## 2018-01-01 RX ADMIN — METOPROLOL TARTRATE 25 MG: 25 TABLET ORAL at 08:34

## 2018-01-01 RX ADMIN — Medication 3 G: at 17:17

## 2018-01-01 RX ADMIN — LISINOPRIL 40 MG: 40 TABLET ORAL at 10:14

## 2018-01-01 RX ADMIN — ASPIRIN 81 MG: 81 TABLET ORAL at 10:09

## 2018-01-01 RX ADMIN — SODIUM CHLORIDE 250 ML: 9 INJECTION, SOLUTION INTRAVENOUS at 06:42

## 2018-01-01 RX ADMIN — SENNA 17.2 MG: 8.6 TABLET, COATED ORAL at 21:02

## 2018-01-01 RX ADMIN — SERTRALINE 50 MG: 50 TABLET, FILM COATED ORAL at 09:44

## 2018-01-01 RX ADMIN — FLUTICASONE PROPIONATE 1 SPRAY: 50 SPRAY, METERED NASAL at 20:59

## 2018-01-01 RX ADMIN — Medication 10 ML: at 17:03

## 2018-01-01 ASSESSMENT — ENCOUNTER SYMPTOMS
SHORTNESS OF BREATH: 0
WHEEZING: 0
EYE DISCHARGE: 0
SHORTNESS OF BREATH: 0
SHORTNESS OF BREATH: 0
BACK PAIN: 0
WHEEZING: 0
PHOTOPHOBIA: 1
BACK PAIN: 0
RHINORRHEA: 0
WHEEZING: 0
EYE REDNESS: 0
ABDOMINAL PAIN: 0
SORE THROAT: 0
CHEST TIGHTNESS: 0
SORE THROAT: 0
SHORTNESS OF BREATH: 0
COLOR CHANGE: 0
NAUSEA: 0
VOMITING: 1
EYE DISCHARGE: 0
EYE PAIN: 0
NAUSEA: 1
COUGH: 0
VOMITING: 1
NAUSEA: 0
DIARRHEA: 0
EYE DISCHARGE: 0
VOMITING: 1
EYE PAIN: 0
DIARRHEA: 0
ABDOMINAL PAIN: 0
COUGH: 0
RHINORRHEA: 0

## 2018-01-01 ASSESSMENT — PAIN DESCRIPTION - LOCATION
LOCATION: HEAD

## 2018-01-01 ASSESSMENT — PAIN SCALES - GENERAL
PAINLEVEL_OUTOF10: 4
PAINLEVEL_OUTOF10: 6
PAINLEVEL_OUTOF10: 0
PAINLEVEL_OUTOF10: 0
PAINLEVEL_OUTOF10: 8
PAINLEVEL_OUTOF10: 0
PAINLEVEL_OUTOF10: 8
PAINLEVEL_OUTOF10: 5
PAINLEVEL_OUTOF10: 8
PAINLEVEL_OUTOF10: 6
PAINLEVEL_OUTOF10: 0
PAINLEVEL_OUTOF10: 8
PAINLEVEL_OUTOF10: 0
PAINLEVEL_OUTOF10: 0
PAINLEVEL_OUTOF10: 4
PAINLEVEL_OUTOF10: 0
PAINLEVEL_OUTOF10: 10
PAINLEVEL_OUTOF10: 0
PAINLEVEL_OUTOF10: 0
PAINLEVEL_OUTOF10: 6
PAINLEVEL_OUTOF10: 2
PAINLEVEL_OUTOF10: 0

## 2018-01-01 ASSESSMENT — PAIN DESCRIPTION - FREQUENCY
FREQUENCY: CONTINUOUS

## 2018-01-01 ASSESSMENT — PAIN DESCRIPTION - ORIENTATION
ORIENTATION: ANTERIOR

## 2018-01-01 ASSESSMENT — PAIN DESCRIPTION - PAIN TYPE
TYPE: ACUTE PAIN

## 2018-01-01 ASSESSMENT — PAIN DESCRIPTION - DESCRIPTORS
DESCRIPTORS: ACHING
DESCRIPTORS: ACHING
DESCRIPTORS: HEADACHE

## 2018-01-01 ASSESSMENT — PATIENT HEALTH QUESTIONNAIRE - PHQ9
1. LITTLE INTEREST OR PLEASURE IN DOING THINGS: 0
2. FEELING DOWN, DEPRESSED OR HOPELESS: 0
SUM OF ALL RESPONSES TO PHQ9 QUESTIONS 1 & 2: 0
SUM OF ALL RESPONSES TO PHQ QUESTIONS 1-9: 0

## 2018-01-01 ASSESSMENT — PAIN DESCRIPTION - ONSET: ONSET: ON-GOING

## 2018-02-03 PROBLEM — R51.9 ACUTE INTRACTABLE HEADACHE: Status: ACTIVE | Noted: 2018-01-01

## 2018-02-03 PROBLEM — R51.9 HEADACHE: Status: ACTIVE | Noted: 2018-01-01

## 2018-02-04 PROBLEM — G44.1 INTRACTABLE VASCULAR HEADACHE: Status: ACTIVE | Noted: 2018-01-01

## 2018-02-04 PROBLEM — G44.86 CERVICOGENIC HEADACHE: Status: ACTIVE | Noted: 2018-01-01

## 2018-02-04 NOTE — ED PROVIDER NOTES
cheek.    SURGICAL HISTORY      has a past surgical history that includes Facial reconstruction surgery (); Tonsillectomy (as a child ); Cholecystectomy (14); and Hip fracture surgery (2014). CURRENT MEDICATIONS       Previous Medications    ACETAMINOPHEN (TYLENOL) 500 MG TABLET    Take 1,000 mg by mouth every 6 hours as needed for Pain    AMLODIPINE (NORVASC) 5 MG TABLET    Take 1 tablet by mouth daily    BENAZEPRIL (LOTENSIN) 40 MG TABLET    Take 1 tablet by mouth daily    FLUTICASONE (FLONASE) 50 MCG/ACT NASAL SPRAY    1 spray by Nasal route nightly    LOXAPINE (LOXITANE) 5 MG CAPSULE    Take 1 capsule by mouth nightly    METOPROLOL TARTRATE (LOPRESSOR) 50 MG TABLET    Take 0.5 tablets by mouth 2 times daily    SERTRALINE (ZOLOFT) 50 MG TABLET    Take 1 tablet by mouth daily    WARFARIN (COUMADIN) 1 MG TABLET    Take 1 tablet by mouth See Admin Instructions    WARFARIN (COUMADIN) 2 MG TABLET    Take 1 tablet by mouth See Admin Instructions       ALLERGIES     has No Known Allergies. FAMILY HISTORY     indicated that her mother is . She indicated that her father is . She indicated that all of her three sisters are . She indicated that two of her three brothers are alive. family history includes Cancer in her mother and sister; Heart Disease in her brother, brother, sister, sister, and sister. SOCIAL HISTORY      reports that she has never smoked. She has never used smokeless tobacco. She reports that she does not drink alcohol or use drugs. PHYSICAL EXAM     INITIAL VITALS:  height is 5' 3\" (1.6 m) and weight is 168 lb (76.2 kg). Her oral temperature is 98.2 °F (36.8 °C). Her blood pressure is 127/64 and her pulse is 69. Her respiration is 16 and oxygen saturation is 96%. Physical Exam   Constitutional: She is oriented to person, place, and time. She appears well-developed and well-nourished. HENT:   Head: Normocephalic and atraumatic.    Right Ear: External

## 2018-02-04 NOTE — H&P
diagnostic testing and  the patient's clinical course. Bhavani Ramirez CNP    D: 02/04/2018 2:10:32       T: 02/04/2018 2:15:48     [email or web address]  Job#: [id number]     Doc#: [id number]    CC:

## 2018-02-04 NOTE — PROGRESS NOTES
prophylaxis: [] Lovenox                                 [] SCDs                                 [] SQ Heparin                                 [] Encourage ambulation           [x] Already on Anticoagulation     Disposition:    [x] Home       [] TCU       [] Rehab       [] Psych       [] SNF       [] [place]       [] Other-    Code Status: Limited        Assessment/Plan:    Anticipated Discharge in : several days    Active Hospital Problems    Diagnosis Date Noted    Acute intractable headache [R51] 02/03/2018    Headache [R51] 02/03/2018       1. 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 2. ENT to see. Will hold coumadin in case invasive procedure needed  3. Will consult neurology to eval the headache. She has no temporal artery tenderness and normal sedrate.           Electronically signed by Caleb Moscoso MD on 2/4/2018 at 9:55 AM

## 2018-02-04 NOTE — FLOWSHEET NOTE
9218 Dr. Elizabeth Sol returned earlier text message, explained that patient's son would like to speak with him.   Phone numbers for [place], [place] and cell sent perfect serve to Dr. Elizabeth Sol

## 2018-02-04 NOTE — ED NOTES
Medication given per order, pt resting quietly on cart. Will monitor.       Hong Crawford RN  02/03/18 6125

## 2018-02-04 NOTE — FLOWSHEET NOTE
1620 Patient has not urinated, secure message sent to Dr. Dominick Galloway for bladder scan, dunbar or straight cath

## 2018-02-04 NOTE — CONSULTS
Smokeless Tobacco    Never Used     History   Alcohol Use No     History   Drug Use No         Family History:       Problem Relation Age of Onset   Keith Rodriguez Cancer Mother     Heart Disease Sister     Heart Disease Brother     Heart Disease Sister     Cancer Sister     Heart Disease Sister     Heart Disease Brother        Review of Systems:  All systems reviewed are negative except what is mentioned in history of present illness. Physical Exam:  BP (!) 123/58   Pulse 63   Temp 98.7 °F (37.1 °C) (Oral)   Resp 18   Ht 5' 3\" (1.6 m)   Wt 162 lb 12.8 oz (73.8 kg)   SpO2 96%   BMI 28.84 kg/m²  I Body mass index is 28.84 kg/m². I Wt Readings from Last 1 Encounters:   02/04/18 162 lb 12.8 oz (73.8 kg)           General appearance - alert, appears to be in moderate pain, oriented to person and place and normal weight  Mental status -Level of Alertness: awake  Orientation: person, place  Memory: abnormal - poor  Fund of Knowledge: abnormal - poor  Attention/Concentration: fair  Language: intact. Mood is flat  Neck - supple, no significant adenopathy, carotids upstroke normal bilaterally, no carotid bruits. There is no LAP in the neck. There is no thyroid enlargement. Neurological -   Cranial [id number]:   Cranial nerve II: Normal. There is full visual fields  Cranial nerve III: Pupils: 4 mm, equal, round, reactive to light   Cranial nerves III, IV, VI: Extraocular Movements: intact   Cranial nerve V: Facial sensation: intact   Cranial nerve VII:Facial strength: intact   Cranial nerve VIII: Hearing: she is hard at hearing. Cranial nerve IX: Palate Elevation intact bilaterally  Cranial nerve XI: Shoulder shrug intact bilaterally  Cranial nerve XII: Tongue midline   neck limited ROM to the right. Otherwise supple without rigidity  DTR's are decreased distal and symmetric  Babinski sign is negative on bilaterally. Motor exam is 5/5 in the upper and lower extremities. Normal muscle tone .  There is no muscle atrophy. There is no muscle fasciculation . Drift No  Sensory is intact forlight touch and cortical sensation . Coordination: finger to nose intact  Gait and station not tested  Abnormal movement none. proprioception normal   Skin - no rashes or lesions, there is skin bruising. Superficial temporal artery pulses are normal.   Musculoskeletal: Has no hand arthritis, no limitation of ROM in any of the four extremities. no joint tenderness, deformity or swelling. There is no leg edema. The Heart was regular in rate and rhythm. No heart murmur  Chest- Clear  Abdomen: soft, Intact bowel sounds. Labs:    CBC: Recent Labs      02/03/18 1912 02/04/18   0650   WBC  12.9*  10.2   HGB  14.7  13.3   PLT  240  212   MCV  90.4  89.6   MCH  30.5  30.7   MCHC  33.7  34.3   RDW  13.8  13.7     CMP:  Recent Labs      02/03/18 1912 02/04/18   0650   NA  131*  134*   K  4.7  4.8   CL  89*  95*   CO2  27  26   BUN  18  16   CREATININE  0.7  0.7   LABGLOM  79*  79*   GLUCOSE  112*  120*   CALCIUM  9.3  8.5     MRI BRAIN:  Results for orders placed during the hospital encounter of 09/07/14   MRI [place]          Patient Name: Yissel [place]        Patient Type: Inpatient           MRN:  [id number]        Gender:  Female                   Account Number:  [de-identified]        Kaye Denis:  Alisa Schirmer M.D. YOB: 1932                                          Pt Loc:  3B                             R a d i o l o g y   R e p o r t         Accession Number:  Procedure Name:             Exam Date/Time:       [id number]      MRI Brain B Stem WO Contr   9/8/2014 11:20:55 AM         CPT4 code       61001           Reason For Exam       due to severe HA           REPORT       BRAIN MRI         CLINICAL INFORMATION:  80year old with severe headache and vomiting since       Saturday. Acute febrile illness. COMPARISON:  10-28-12.          TECHNIQUE: 9/8/2014 11:20:55 AM         2. Stable lobulated area of cystic encephalomalacia within the right       temporal lobe. 3.   Global volume loss. 4.   Moderate severity chronic small vessel ischemic changes. 5.   Chronic opacification of the middle ear cavity and mastoid air cells       on the right. Dictated By :  Juliane Chung M.D. Certified Electronic Signature       [facility].       Dictated Date and Time:  08-SEP-2014 11:49       Transcribed By:  ZACK       Transcribed Date and Time:  08-SEP-2014 13:08       Approved By:  Juliane Chung M.D. Approve Date and Time:  10-SEP-2014 13:51         Technologist:  Ary Man           Page 2 of 2              Print date/time:9/10/2014 1:51 PM     Results for orders placed during the hospital encounter of 04/18/16   MRI brain with and without contrast    Narrative PROCEDURE: MRI BRAIN W WO CONTRAST    CLINICAL INFORMATION: Severe headache. Possible meningitis. COMPARISON: MRI brain, 8 September 2014; CT brain, 17 April 2016. TECHNIQUE: Using a 1.5 Anu Siemens scanner, axial diffusion weighted echoplanar imaging, T2-weighted turbo spin-echo, fat-saturated T2-weighted fluid attenuated inversion recovery, and proton density weighted gradient recall images were obtained of the   entire brain. Pre-and postcontrast (OptiMARK, 15 mL) sagittal 3-D T1-weighted volume acquisition gradient recall images were also obtained, with multiplanar reconstructions provided. FINDINGS: Stable global brain volume is demonstrated with no evidence of diffusion abnormality. No blood products in the brain parenchyma. No convincing parenchymal edema. Stable retrocerebellar arachnoid cyst. Stable hyperintense T2/hypointense T1 signal abnormalities of the white matter of both cerebral hemispheres.     Essentially unchanged appearance of the brain parenchyma since the prior exam. Stable CSF isointense

## 2018-02-05 NOTE — CARE COORDINATION
2/5/18, 9:39 AM    DISCHARGE BARRIERS    SW in room to speak with patient. Patient asleep with food tray in front of her and blue nausea bag in her hand. Patient woke up briefly, asked for food tray to be moved away from her and then fell back asleep. SW to check back on patient later today.

## 2018-02-05 NOTE — PROGRESS NOTES
nausea;  C/s pain mgmt for HA. Cont monitor HA, nausea, resp status, lytes, BP, HR.  PT/OT and c/s SS for d/c planning as pt lives alone. Chief Complaint: headache    Hospital Course: Joana Monk is a 80 y.o. female with chronic afib on coumadin, HTN, Dementia, HLP, depression admitted with intractable headache. Concern for mastoiditis however pt had no ear pain or drainage - ENT consulted and felt was chronic and not acute issue. Neurology consulted for the headache. Having nausea off and on. Subjective:   -- 2/5/2018  --> pt states currently her HA is \"ok\" - no current pain. Nausea off and on - no documented emesis. No abd pain. Denies cp, sob. Denies f/c. Ate some this am.  Sat down to 85% this am and placed on 2L O2. Afebrile. Last BM -- pt unsure when last BM was. Medications:  Reviewed    Infusion Medications    Scheduled Medications    amLODIPine  5 mg Oral Daily    lisinopril  40 mg Oral Daily    fluticasone  1 spray Nasal Nightly    loxapine  5 mg Oral Nightly    metoprolol tartrate  25 mg Oral BID    sertraline  50 mg Oral Daily    ampicillin-sulbactam  3 g Intravenous Q6H    warfarin (COUMADIN) daily dosing (placeholder)   Other RX Placeholder    sodium chloride flush  10 mL Intravenous 2 times per day     PRN Meds: sodium chloride flush, docusate sodium, HYDROcodone 5 mg - acetaminophen, butalbital-acetaminophen-caffeine, acetaminophen, magnesium hydroxide, ondansetron, morphine      Intake/Output Summary (Last 24 hours) at 02/05/18 1012  Last data filed at 02/05/18 0342   Gross per 24 hour   Intake             1625 ml   Output             1560 ml   Net               65 ml       Diet:  DIET GENERAL;    Exam:  /64   Pulse 60   Temp 98.9 °F (37.2 °C) (Oral)   Resp 18   Ht 5' 3\" (1.6 m)   Wt 167 lb 14.4 oz (76.2 kg)   SpO2 92%   BMI 29.74 kg/m²     General appearance: No apparent distress, appears stated age and cooperative.    HEENT: Pupils equal, round, and reactive to light. Conjunctivae/corneas clear. Little lig lag on right  Neck: Supple, with full range of motion. No jugular venous distention. Trachea midline. Respiratory:  Normal respiratory effort. Diminished in bases, no wheezes, rales, rhonchi. No respiratory distress or accessory muscle use. Cardiovascular:  Irreg, irreg with normal S1/S2 without murmurs, rubs or gallops. Abdomen: Soft, non-tender, non-distended with hypoactive bowel sounds. No rebound or guarding  Musculoskeletal: No clubbing, cyanosis or edema bilaterally. Full range of motion without deformity. No calf tenderness palpation  Skin: Skin color, texture, turgor normal.  No rashes or lesions. Neurologic:  Neurovascularly intact without any focal sensory/motor deficits. Cranial nerves: II-XII intact, grossly non-focal.  Psychiatric: Alert and oriented, thought content appropriate, normal insight  Capillary Refill: Brisk,< 3 seconds   Peripheral Pulses: +2 palpable, equal bilaterally       Labs:   Recent Labs      02/03/18 1912 02/04/18   0650   WBC  12.9*  10.2   HGB  14.7  13.3   HCT  43.7  38.7   PLT  240  212     Recent Labs      02/03/18 1912 02/04/18   0650   NA  131*  134*   K  4.7  4.8   CL  89*  95*   CO2  27  26   BUN  18  16   CREATININE  0.7  0.7   CALCIUM  9.3  8.5     No results for input(s): AST, ALT, BILIDIR, BILITOT, ALKPHOS in the last 72 hours. Recent Labs      02/03/18 1912 02/04/18   0650  02/05/18   0531   INR  2.34*  2.51*  3.04*     No results for input(s): Dolly Duqueer in the last 72 hours. Urinalysis:    Lab Results   Component Value Date    NITRU NEGATIVE 02/04/2018    WBCUA 0-2 02/04/2018    BACTERIA NONE 02/04/2018    RBCUA 10-15 02/04/2018    BLOODU NEGATIVE 02/04/2018    SPECGRAV 1.021 02/04/2018    GLUCOSEU NEGATIVE 02/03/2018       Radiology:  MRI CERVICAL SPINE WO CONTRAST   Final Result      1.  There are punctate foci noted within the juana and midbrain which are new from the prior

## 2018-02-05 NOTE — CARE COORDINATION
2/5/18, 12:07 PM      Alise Yuan       Admitted from: ER 2/3/2018/ [facility] [address] day: 2   Location: -20/020-A Reason for admit: Acute intractable headache, unspecified headache type [R51]  Headache [R51]  Intractable vascular headache [G44.1]  Intractable vascular headache [G44.1] Status: IP  Admit order signed?: yes  PMH:  has a past medical history of Allergic rhinitis; Depression; Headache; Hyperlipidemia; Hypertension; Intermittent atrial fibrillation ([address] MID (multi infarct dementia); Pneumonia; and Squamous cell cancer of skin of right cheek. Medications:  Scheduled Meds:   warfarin  0.5 mg Oral Once    amLODIPine  5 mg Oral Daily    lisinopril  40 mg Oral Daily    fluticasone  1 spray Nasal Nightly    loxapine  5 mg Oral Nightly    metoprolol tartrate  25 mg Oral BID    sertraline  50 mg Oral Daily    ampicillin-sulbactam  3 g Intravenous Q6H    warfarin (COUMADIN) daily dosing (placeholder)   Other RX Placeholder    sodium chloride flush  10 mL Intravenous 2 times per day     Continuous Infusions:    Pertinent Info/Orders/Treatment Plan:  IV ATB, pain control, antiemetics. Altman inserted due to urinary retention with immediate 1200 mL return. KUB and cxray pending. Neuro, ENT, and Pain Management consults pending. PT/OT. Diet: DIET GENERAL;   DVT Prophylaxis: Coumadin  Smoking status:  reports that she has never smoked.  She has never used smokeless tobacco.   Influenza Vaccination Screening Completed: yes  Pneumonia Vaccination Screening Completed: yes  Core measures: vte  PCP: Isis Berg MD  Readmission:   no  Risk Score: 19.5     Discharge Planning  Current Residence:  Independent Living  Living Arrangements:  Alone   Support Systems:  Children, Family Members  Current Services PTA:     Potential Assistance Needed:  N/A  Potential Assistance Purchasing Medications:  No  Does patient want to participate in local refill/ meds to beds program?  No  Type of Home Care Services: None  Patient expects to be discharged to:  Patient plans to return to her independent living apt at discharge. Expected Discharge date:  02/05/18  Follow Up Appointment: Best Day/ Time: Monday AM    Discharge Plan: Kade Izquierdo is from [facility] alone at [facility]       Evaluation: yes

## 2018-02-06 NOTE — CARE COORDINATION
2/6/18 2:30 PM     Cxray shows possible pneumonia. ID now consulted. PT/OT to work with patient. Neuro and ENT following. Pain management signed off.       Pneumonia Core measures:  Blood cultures prior to atb-no   Offer vaccines-yes  Education-added to discharge  Zone management provided to patient, Laurie Bernal RN notified  Pneumonia education smartphrase added to AVS

## 2018-02-06 NOTE — PROGRESS NOTES
Clinical Pharmacy Note    Warfarin consult follow-up    Recent Labs      02/06/18   0513   INR  3.01*     Recent Labs      02/03/18   1912  02/04/18   0650   HGB  14.7  13.3   HCT  43.7  38.7   PLT  240  212       Significant Drug-Drug Interactions:  New warfarin drug-drug interactions: none  Discontinued drug-drug interactions: none  Current warfarin drug-drug interactions: Unasyn, butalbital-acetaminophen-caffeine, sertraline (home)     Date INR Warfarin Dose   2/4/2018 2.51 2.5 mg   2/5/2018 3.04 0.5 mg    2/6/2018  3.01  1 mg                                       Notes:                     Daily PT/INR until stable within therapeutic range.      Alyson Bean PharmD 2/6/2018 9:59 AM

## 2018-02-06 NOTE — PROGRESS NOTES
Pain Management    Progress Note      2/6/2018 1:21 PM     · SUBJECTIVE:    · Chief Complaint: Headache   · Patient seen in her room today up resting in chair taking a nap. Resting very comfortably. · When awoken denies any headache or pain at all today.    · She has used 2 doses of prn tylenol in the past 24 hours   · Pain rating is 0/10 denies any head or neck pain   · Constipation: last bowel movement was prior to admission       Current medications:    warfarin  1 mg Oral Once    docusate sodium  100 mg Oral BID    senna  2 tablet Oral Nightly    polyethylene glycol  17 g Oral Daily    amLODIPine  5 mg Oral Daily    lisinopril  40 mg Oral Daily    fluticasone  1 spray Nasal Nightly    loxapine  5 mg Oral Nightly    metoprolol tartrate  25 mg Oral BID    sertraline  50 mg Oral Daily    ampicillin-sulbactam  3 g Intravenous Q6H    warfarin (COUMADIN) daily dosing (placeholder)   Other RX Placeholder    sodium chloride flush  10 mL Intravenous 2 times per day   ·   ·                                    butalbital-acetaminophen-caffeine, lidocaine, sodium chloride flush, docusate sodium, acetaminophen, magnesium hydroxide, ondansetron  ·                                    @MEDSINFUSIONS        REVIEW OF SYSTEMS:  CONSTITUTIONAL:  positive for  fatigue   EYES:  negative  HEENT:  negative  RESPIRATORY:  On 2 liters of oxygen per nasal cannula   CARDIOVASCULAR:  negative  GASTROINTESTINAL:  Constipation- + BM 2/3/2018   GENITOURINARY:  negative  INTEGUMENT/BREAST:  negative  HEMATOLOGIC/LYMPHATIC:  negative  ALLERGIC/IMMUNOLOGIC:  negative  ENDOCRINE:  negative  MUSCULOSKELETAL:  positive for  muscle weakness  NEUROLOGICAL: Dementia, denies headache   BEHAVIOR/PSYCH:  negative  Otherwise 11 system review noncontributory    PHYSICAL EXAM:  /60   Pulse 61   Temp 97.4 °F (36.3 °C) (Oral)   Resp 16   Ht 5' 3\" (1.6 m)   Wt 168 lb (76.2 kg)   SpO2 97%   BMI 29.76 kg/m²  I Body mass index is 29.76 kg/m². I   Wt Readings from Last 1 Encounters:   02/06/18 168 lb (76.2 kg)      General appearance: appears stated age, cooperative, distracted, fatigued, no distress and slowed mentation   Head: Normocephalic, atraumatic, denies any headache   Eyes: EOMI  Ears: Right and left external ears within normal limits  Nose: Nasal septum without deviation  Mouth/throat: Moist and clear  Neck: supple, non-tender, normal ROM   Lungs: clear to auscultation bilaterally and diminished breath sounds posterior - bilateral, on 2 liters of oxygen per nasal cannula   Heart: Irregularly, irregular rhythm, denies any chest pain   Abdomen: soft, non-tender; bowel sounds normal; no masses,  no organomegaly, denies nausea   Musculoskeletal: Generalized weakness, normal ROM all 4 extremities   Neurological:  Fatigued, oriented to person, place, ands situation, confused to time. Decreased thought processing, delayed recall. No new motor or sensory deficits, denies headache  Skin: no rashes or wounds  Mood: pleasant, calm     DATA    Recent Labs      02/03/18 1912 02/04/18   0650   WBC  12.9*  10.2   RBC  4.83  4.32   HGB  14.7  13.3   HCT  43.7  38.7   MCV  90.4  89.6   MCH  30.5  30.7   MCHC  33.7  34.3   RDW  13.8  13.7   PLT  240  212   MPV  7.7  8.1     Recent Labs      02/03/18 1912 02/04/18   0650  02/05/18   1017   NA  131*  134*  134*   K  4.7  4.8  4.2   CL  89*  95*  98   CO2  27  26  24   BUN  18  16  15   CREATININE  0.7  0.7  0.7   GLUCOSE  112*  120*  153*   CALCIUM  9.3  8.5  8.3*     No results for input(s): POCGLU in the last 72 hours.     ASSESSMENT AND PLAN  Patient Active Problem List   Diagnosis    Depression    Presbycusis of both ears    Hyperlipidemia    Paroxysmal atrial fibrillation (HCC)    Venous stasis dermatitis of both lower extremities    MID (multi infarct dementia)    Cervical spinal stenosis    Lichen sclerosus et atrophicus    Mastoiditis, chronic    Squamous cell cancer of skin of right cheek    Allergic rhinitis    Anticoagulated on Coumadin    Essential hypertension    Acute intractable headache    Headache    Cervicogenic headache   1. Denies any headache or any pain today   2. Continue current medications, tylenol prn, intranasal lidocaine prn for headache   3. Pain management team signing off as headache resolved    Spent 25 minutes evaluating and examining patient and completing documentation      Miranda Dougherty CNP, 2/6/2018, 1:21 PM    I have evaluated the patient and reviewed the case with the nurse practitioner. I agree with the current plan of care including the workup, evaluation, management, and diagnosis. Care plan has been discussed. I agree with above documentation.

## 2018-02-06 NOTE — PROCEDURES
[address] [place] [address]                            ELECTROENCEPHALOGRAM REPORT    PATIENT NAME: Katy Vegas                      :        1932  MED REC NO:   [id number]                           ROOM:       0020  ACCOUNT NO:   [de-identified]                           ADMIT DATE: 2018  PROVIDER:     Hi Torres. Americo Crooks MD    DATE OF EE2018    REFERRING PROVIDER:  Hi Torres. MD Maryam    CLINICAL HISTORY:  An 77-year-old female with presentation of headache,  nausea. CLINICAL INTERPRETATION:  This is a 17-channel EEG performed without sleep  deprivation. Hyperventilation was not performed. Photic stimulation was  performed. The patient is described as alert. Background rhythm activity was noted to be 9 Hz in the posterior parietal  area, symmetric, attenuates with eye opening. The patient was noted to be  drowsy during parts of recording. Light stages of sleep are seen during  the recording. Hyperventilation was not performed. Photic stimulation was  performed without abnormality. There was no evidence of epileptiform  activity appreciated throughout this recording. IMPRESSION:  This is a normal EEG. There was no evidence of epileptiform  activity appreciated.         Shaquille Kenny MD    D: 2018 7:58:49       T: 2018 8:00:04     [email or web address]  Job#: [id number]     Doc#: [id number]    CC:

## 2018-02-06 NOTE — PROGRESS NOTES
Oral Once    docusate sodium  100 mg Oral BID    senna  2 tablet Oral Nightly    polyethylene glycol  17 g Oral Daily    amLODIPine  5 mg Oral Daily    lisinopril  40 mg Oral Daily    fluticasone  1 spray Nasal Nightly    loxapine  5 mg Oral Nightly    metoprolol tartrate  25 mg Oral BID    sertraline  50 mg Oral Daily    ampicillin-sulbactam  3 g Intravenous Q6H    warfarin (COUMADIN) daily dosing (placeholder)   Other RX Placeholder    sodium chloride flush  10 mL Intravenous 2 times per day     PRN Meds: butalbital-acetaminophen-caffeine, lidocaine, sodium chloride flush, docusate sodium, acetaminophen, magnesium hydroxide, ondansetron      Intake/Output Summary (Last 24 hours) at 02/06/18 1132  Last data filed at 02/06/18 9669   Gross per 24 hour   Intake           741.62 ml   Output              675 ml   Net            66.62 ml       Diet:  DIET GENERAL;    Exam:  BP (!) 116/56   Pulse 56   Temp 99 °F (37.2 °C) (Oral)   Resp 16   Ht 5' 3\" (1.6 m)   Wt 168 lb (76.2 kg)   SpO2 92%   BMI 29.76 kg/m²     General appearance: No apparent distress, appears stated age and cooperative. HEENT: Pupils equal, round, and reactive to light. Conjunctivae/corneas clear. Neck: Supple, with full range of motion. No jugular venous distention. Trachea midline. Respiratory:  Normal respiratory effort. Clear to auscultation, bilaterally without Rales/Wheezes/Rhonchi. No respiratory distress or accessory muscle use. Cardiovascular: Regular rate and rhythm with normal S1/S2 without murmurs, rubs or gallops. Abdomen: Soft, non-tender, non-distended with normal bowel sounds. No rebound or guarding  Musculoskeletal: No clubbing, cyanosis or edema bilaterally. Full range of motion without deformity. No calf tenderness palpation  Skin: Skin color, texture, turgor normal.  No rashes or lesions. Neurologic:  Neurovascularly intact without any focal sensory/motor deficits.  Cranial nerves: II-XII intact, grossly STANDARD (2 VW)   Final Result   1. Borderline heart size. 2. Mild infiltrates in the superior segment right lower lobe as well as the left lower lobe. Appearance most consistent with pneumonia. No definite effusion seen. **This report has been created using voice recognition software. It may contain minor errors which are inherent in voice recognition technology. **      Final report electronically signed by Dr. Noe Dawn on 2/5/2018 2:46 PM      MRI CERVICAL SPINE WO CONTRAST   Final Result      1. There are punctate foci noted within the juana and midbrain which are new from the prior exam. This is nonspecific however may correspond to sequela of chronic microvascular ischemic change. 2. There are multilevel degenerative changes within the cervical spine which are further discussed by level in the findings. Most significantly, at C3-C4, there is a disc osteophyte complex and ligamentum flavum thickening which causes flattening of the    underlying cord and moderate to severe spinal canal narrowing. This is worse compared to the prior exam. Stable bilateral moderate neural foraminal narrowing is also present at this level. **This report has been created using voice recognition software. It may contain minor errors which are inherent in voice recognition technology. **      Final report electronically signed by Dr. Vanessa Chance on 2/4/2018 2:33 PM      CT Head WO Contrast   Final Result       1. Stable senescent changes are present within the brain without acute intracranial abnormality. There are also stable changes of the right temporal lobe which likely correspond to a remote infarct. 2. There is redemonstration of opacification of the mastoid air cells and middle ear cavity on the right. This was also present on the prior exam and may correspond to recurrent or chronic otomastoiditis. **This report has been created using voice recognition software.  It may contain minor errors which are inherent in voice recognition technology. **      Final report electronically signed by Dr. Virginia Mckeon on 2/3/2018 8:03 PM          Diet: DIET GENERAL;    Altman: yes    Microbiology:  Urine cx 2/3 = contaminants    Influenza 2/6 (-)    Tele:  afib - rate to 50's at times    DVT prophylaxis: [] Lovenox                                 [] SCDs                                 [] SQ Heparin                                 [] Encourage ambulation           [x] Already on Anticoagulation -- coumadin     Disposition:    [] Home       [] TCU       [] Rehab       [] Psych       [] SNF       [] [facility]       [x] Other-? ?HH vs ECF    Code Status: Limited    PT/OT Eval Status: consulted        Electronically signed by PHUONG ESPINAL MD on 2/6/2018 at 11:32 AM when pt evaluated - final note filed late

## 2018-02-06 NOTE — PLAN OF CARE
Problem: Pain:  Goal: Pain level will decrease  Pain level will decrease    Outcome: Ongoing  Patient denies pain this shift. PRN fioricet and lidocaine available    Problem: Discharge Planning:  Goal: Participates in care planning  Participates in care planning   Outcome: Ongoing  Patient up to date with plan of care. Will monitor  Goal: Discharged to appropriate level of care  Discharged to appropriate level of care   Outcome: Ongoing  Will return home at discharge. Will monitor    Problem: Pain:  Goal: Pain level will decrease  Pain level will decrease    Outcome: Ongoing  Patient denies pain this shift. PRN fioricet and lidocaine available    Problem: Skin Integrity - Impaired:  Goal: Will show no infection signs and symptoms  Will show no infection signs and symptoms   Outcome: Ongoing  Running temps this shift. PRN tylenol administered. Will monitor  Goal: Absence of new skin breakdown  Absence of new skin breakdown   Outcome: Ongoing  No new breakdown. Turning every 2 hours. Will monitor    Problem: Risk for Impaired Skin Integrity  Goal: Tissue integrity - skin and mucous membranes  Structural intactness and normal physiological function of skin and  mucous membranes. Outcome: Ongoing  No breakdown. Turning every 2 hours. Will monitor    Problem: Falls - Risk of:  Goal: Will remain free from falls  Will remain free from falls   Outcome: Ongoing  No falls noted this shift. Continue falling star program. Bed alarm on, bed in low position. Call light and personal belongings in reach. Patient uses call light appropriately. Problem: Neurological  Goal: Maximum potential motor/sensory/cognitive function  Outcome: Ongoing  A&Ox4 but has delayed responses and is weak and moves slow. Will monitor    Problem: Cardiovascular  Goal: Hemodynamic stability  Outcome: Ongoing  Vitals stable. Will monitor    Problem:   Goal: Adequate urinary output  Outcome: Ongoing  Patient has over 450 out this shift.  Altman remains

## 2018-02-07 NOTE — PROGRESS NOTES
normal, +fever 2/5   --> unasyn as of 2/4    -- O2 and wean as able - added acapella  -- no blood cx done at time of fever 2/5 and pt already on atbx  -- checked influenza 2/6 (-)  6. Acute febrile illness --> temp up to 102.4 2/5 pm -- not pOA - likely due to pneumonia but with ?mastoiditis consulted ID 2/6 --  apprec ID c/s - zmax added 2/6 to unasyn started 2/4 --> improving 2/7.   --  U/a 2/4 (-)   -- no blood cx done at time of fever and already on atbx. -- checked influenza 2/6 and (-)  7. Hyponatremia -- chronic component - stable at 133 - 134 - monitor -- free T4 normal 2/4/18 -- she is on SSRI (zoloft) thus ? SIADH component -- pt asx - monitor  8. Constipation -- cont bowel meds - added prn suppository - monitor for BMs  9. Urinary retention -- dunbar placed on admission -- remove 2/7 and voiding trial - u/a on admission 2/4 (-)  10. Paroxysmal afib/flutter -- cont coumadin, lopressor - rate stable  11. Essential HTN -- cont norvasc, lisinopril, lopressor -- stable - monitor and adjust prn  12. Chronic anticoagulation for afib - cont coumadin -- INR therapeutic - pharm dosing  13. HLP  14. Hx esophagitis   15. Depression -- cont zoloft, loxapine  16. Mild pulm HTN  17. Grade 1 diastolic dysfxn -- per echo 2/6/18 - fluid status stable - cont monitor  18. Mild/mod MR, mild/mod TR, mild WI  19. Dementia w/o behavioral disturbance -- mood stable - monitor  20. Generalized weakness -- PT/OT c/s -- SS c/s for d/c planning -- plan for ECF at d/c    Dispo  -- 2/7 --> apprec ID assist -- HA is resolved, fever improving -- wean O2 - remove dunbar - increase activity -- cont zmax, unasyn - monitor lytes, resp status, po intake, awaiting BM also - likely ECF tomorrow    -- 2/6 --> +new pneumonia, +fever - c/s ID for recs - cont monitor resp status - check influenza, urine strep, legionella -- added acapella, IS, wean O2 as able - HA and nausea improving -- cont bowel meds and add suppository.   Cont monitor lytes, BP, to severe spinal canal narrowing. This is worse compared to the prior exam. Stable bilateral moderate neural foraminal narrowing is also present at this level. **This report has been created using voice recognition software. It may contain minor errors which are inherent in voice recognition technology. **      Final report electronically signed by Dr. Kelly Marmolejo on 2/4/2018 2:33 PM      CT Head WO Contrast   Final Result       1. Stable senescent changes are present within the brain without acute intracranial abnormality. There are also stable changes of the right temporal lobe which likely correspond to a remote infarct. 2. There is redemonstration of opacification of the mastoid air cells and middle ear cavity on the right. This was also present on the prior exam and may correspond to recurrent or chronic otomastoiditis. **This report has been created using voice recognition software. It may contain minor errors which are inherent in voice recognition technology. **      Final report electronically signed by Dr. Kelly Marmolejo on 2/3/2018 8:03 PM          Diet: DIET GENERAL;    Altman: yes - remove 2/7    Microbiology:  Urine cx 2/3/18 = contaminants    Influenza 2/6 (-)    Legionella (p), strep (p)    Tele:  afib - rate controlled    DVT prophylaxis: [] Lovenox                                 [] SCDs                                 [] SQ Heparin                                 [] Encourage ambulation           [x] Already on Anticoagulation - coumadin     Disposition:    [] Home       [] TCU       [] Rehab       [] Psych       [x] SNF       [] [facility]       [] Other-    Code Status: Limited    PT/OT Eval Status: following        Electronically signed by Nick Meraz MD on 2/7/2018 at 11:06 AM when pt evaluated - final note filed late

## 2018-02-07 NOTE — CARE COORDINATION
2/7/18, 10:44 AM    DISCHARGE BARRIERS    Discussed treatment recommendations with daughter-in-law Bonnie, referral made to Galilea Cornell at [address] [facility]
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
## 2018-02-07 NOTE — CONSULTS
[address] [place] [address]                                   CONSULTATION    PATIENT NAME: Geno Lawton                      :        1932  MED REC NO:   [id number]                           ROOM:       0020  ACCOUNT NO:   [de-identified]                           ADMIT DATE: 2018  PROVIDER:     Mirtha Fleming. Robert Brooks M.D.    Luz Gomese:  2018    REASON FOR CONSULTATION:  Low-grade fever with pneumonia and mastoiditis. HISTORY OF PRESENT ILLNESS:  She is an 80year-old female patient who was  admitted to the hospital on 2018 with headache. She is an  59-year-old female patient with past medical history significant for  hypertension, history of vascular dementia, and was brought to the hospital  with headache. She had history of headache in the past; however, this was  persistent and came to the hospital.  She has some dry cough. She denied  any neck stiffness, photophobia, or phonophobia. There was not any  drainage from her right ear. 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 She had been having  throbbing headache. She had not any vomiting or diarrhea. She was started  on Unasyn. ENT has been consulted. Neurology and pain team were also  called for her headache. She is very hard of hearing and she is not a very  good historian. PAST MEDICAL HISTORY:  Significant for history of depression, hypertension,  chronic atrial fibrillation, dementia, history of pneumonia, history of  squamous cell cancer on her right cheek. She had history of depression. PAST SURGICAL HISTORY:  Cholecystectomy, facial reconstruction surgery, hip  fracture surgery, and tonsillectomy. ALLERGIES:  She has no known drug allergies. SOCIAL HISTORY:  She is . No alcohol or drugs.     MEDICATIONS:  Amlodipine, Unasyn, Fioricet, fluticasone, lisinopril,  metoprolol,
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
## 2018-02-08 NOTE — DISCHARGE SUMMARY
is on SSRI (zoloft) thus ? SIADH component -- pt asx - monitor  28. Constipation -- cont bowel meds - added prn suppository -- ++BM prior to d/c  29. Urinary retention -- dunbar placed on admission -- remove 2/7 and voiding trial -- pt urinating w/o difficulty prior to d/c  - u/a on admission 2/4 (-)  30. Paroxysmal afib/flutter -- cont coumadin, lopressor - rate stable  31. Essential HTN -- cont norvasc, lisinopril, lopressor -- stable - monitor and adjust prn  32. Chronic anticoagulation for afib - cont coumadin -- INR therapeutic - pharm dosing  33. HLP  34. Hx esophagitis   35. Depression -- cont zoloft, loxapine  36. Mild pulm HTN  37. Grade 1 diastolic dysfxn -- per echo 2/6/18 - fluid status stable - cont monitor  38. Mild/mod MR, mild/mod TR, mild WV  39. Dementia w/o behavioral disturbance -- mood stable - monitor  40. Generalized weakness -- PT/OT c/s -- SS c/s for d/c planning -- plan for ECF at d/c    [facility] was afebrile, HD stable, weaned off O2 and cleared by consultants for d/c. She was still weak and d/c to [facility] for further therapies.        Discharge Medications:   Nayeli Back   Home Medication Instructions [id number]    Printed on:02/08/18 1593   Medication Information                      acetaminophen (TYLENOL) 500 MG tablet  Take 1,000 mg by mouth every 6 hours as needed for Pain             amLODIPine (NORVASC) 5 MG tablet  Take 1 tablet by mouth daily             azithromycin (ZITHROMAX) 250 MG tablet  Take 1 tablet by mouth daily for 4 days             benazepril (LOTENSIN) 40 MG tablet  Take 1 tablet by mouth daily             bisacodyl (DULCOLAX) 10 MG suppository  Place 1 suppository rectally as needed for Constipation             docusate sodium (COLACE, DULCOLAX) 100 MG CAPS  Take 100 mg by mouth 2 times daily             fluticasone (FLONASE) 50 MCG/ACT nasal spray  1 spray by Nasal route nightly             loxapine (LOXITANE) 5 MG capsule  Take 1 capsule by mouth nightly pattern with moderate amount retained stool within the colon suggested underlying constipation. **This report has been created using voice recognition software. It may contain minor errors which are inherent in voice recognition technology. **      Final report electronically signed by Dr. Gabriel Mchugh on 2/5/2018 2:48 PM      XR CHEST STANDARD (2 VW)   Final Result   1. Borderline heart size. 2. Mild infiltrates in the superior segment right lower lobe as well as the left lower lobe. Appearance most consistent with pneumonia. No definite effusion seen. **This report has been created using voice recognition software. It may contain minor errors which are inherent in voice recognition technology. **      Final report electronically signed by Dr. Minnie Subramanian on 2/5/2018 2:46 PM      MRI CERVICAL SPINE WO CONTRAST   Final Result      1. There are punctate foci noted within the juana and midbrain which are new from the prior exam. This is nonspecific however may correspond to sequela of chronic microvascular ischemic change. 2. There are multilevel degenerative changes within the cervical spine which are further discussed by level in the findings. Most significantly, at C3-C4, there is a disc osteophyte complex and ligamentum flavum thickening which causes flattening of the    underlying cord and moderate to severe spinal canal narrowing. This is worse compared to the prior exam. Stable bilateral moderate neural foraminal narrowing is also present at this level. **This report has been created using voice recognition software. It may contain minor errors which are inherent in voice recognition technology. **      Final report electronically signed by Dr. Joslyn Booth on 2/4/2018 2:33 PM      CT Head WO Contrast   Final Result       1. Stable senescent changes are present within the brain without acute intracranial abnormality.  There are also stable changes of the right temporal lobe which likely correspond to a remote infarct. 2. There is redemonstration of opacification of the mastoid air cells and middle ear cavity on the right. This was also present on the prior exam and may correspond to recurrent or chronic otomastoiditis. **This report has been created using voice recognition software. It may contain minor errors which are inherent in voice recognition technology. **      Final report electronically signed by Dr. Elaine Mendoza on 2/3/2018 8:03 PM          Labs:   Recent Results (from the past 67 hour(s))   Comprehensive metabolic panel    Collection Time: 02/05/18 10:17 AM   Result Value Ref Range    Glucose 153 (H) 70 - 108 mg/dL    CREATININE 0.7 0.4 - 1.2 mg/dL    BUN 15 7 - 22 mg/dL    Sodium 134 (L) 135 - 145 meq/L    Potassium 4.2 3.5 - 5.2 meq/L    Chloride 98 98 - 111 meq/L    CO2 24 23 - 33 meq/L    Calcium 8.3 (L) 8.5 - 10.5 mg/dL    AST 12 5 - 40 U/L    Alkaline Phosphatase 68 38 - 126 U/L    Total Protein 6.0 (L) 6.1 - 8.0 g/dL    Alb 3.0 (L) 3.5 - 5.1 g/dL    Total Bilirubin 0.5 0.3 - 1.2 mg/dL    ALT 9 (L) 11 - 66 U/L   Lipase    Collection Time: 02/05/18 10:17 AM   Result Value Ref Range    Lipase 36.5 5.6 - 51.3 U/L   Anion Gap    Collection Time: 02/05/18 10:17 AM   Result Value Ref Range    Anion Gap 12.0 8.0 - 16.0 meq/L   Glomerular Filtration Rate, Estimated    Collection Time: 02/05/18 10:17 AM   Result Value Ref Range    Est, Glom Filt Rate 79 (A) ml/min/1.73m2   Troponin    Collection Time: 02/05/18 10:27 AM   Result Value Ref Range    Troponin T < 0.010 ng/ml   EKG 12 Lead    Collection Time: 02/05/18 12:13 PM   Result Value Ref Range    Ventricular Rate 78 BPM    Atrial Rate 375 BPM    QRS Duration 70 ms    Q-T Interval 368 ms    QTc Calculation (Bazett) 419 ms    R Axis -4 degrees    T Axis 6 degrees   Protime-INR    Collection Time: 02/06/18  5:13 AM   Result Value Ref Range    INR 3.01 (H) 0.85 - 1.13   Procalcitonin - 7.7 thou/mm3    Lymphocytes # 0.6 (L) 1.0 - 4.8 thou/mm3    Monocytes # 0.5 0.4 - 1.3 thou/mm3    Eosinophils # 0.1 0.0 - 0.4 thou/mm3    Basophils # 0.0 0.0 - 0.1 thou/mm3   Anion Gap    Collection Time: 02/08/18  5:58 AM   Result Value Ref Range    Anion Gap 12.0 8.0 - 16.0 meq/L   Glomerular Filtration Rate, Estimated    Collection Time: 02/08/18  5:58 AM   Result Value Ref Range    Est, Glom Filt Rate >90 ml/min/1.73m2       Discharge condition: good  Disposition: Skilled Facility  Time spent on discharge: 40 min    Electronically signed by Shaquille Rivera MD on 2/8/2018 at 9:47 AM

## 2018-02-21 NOTE — CARE COORDINATION
Writer placed call to [employer]. S/W Charity Milligan, she transferred me to [facility] for therapy update and D/C plan. LRM for [facility] to call me back.      Jody Alberto RN  Care Transitions Coordinator  [phone]

## 2018-02-28 NOTE — TELEPHONE ENCOUNTER
When talked with patient's family regarding INR requested more 2 mg tabs to be sent to [facility].  Set to send

## 2018-02-28 NOTE — PROGRESS NOTES
INR is very elevated. Hold 2 days of coumadin then take coumadin 2 mg daily. Recheck INR in 5 days. Please advise patient.   Shea Pyle MD

## 2018-03-02 PROBLEM — R51.9 HEADACHE: Status: RESOLVED | Noted: 2018-01-01 | Resolved: 2018-01-01

## 2018-03-02 NOTE — PROGRESS NOTES
Heart Disease Sister     Heart Disease Brother      Social History   Substance Use Topics    Smoking status: Never Smoker    Smokeless tobacco: Never Used    Alcohol use No      Current Outpatient Prescriptions   Medication Sig Dispense Refill    warfarin (COUMADIN) 2 MG tablet Take 1 tablet by mouth See Admin Instructions 90 tablet 1    magnesium hydroxide (MILK OF MAGNESIA) 400 MG/5ML suspension Take 30 mLs by mouth daily as needed for Constipation      polyethylene glycol (GLYCOLAX) packet Take 17 g by mouth daily as needed for Constipation 527 g 1    amLODIPine (NORVASC) 5 MG tablet Take 1 tablet by mouth daily 90 tablet 1    benazepril (LOTENSIN) 40 MG tablet Take 1 tablet by mouth daily 90 tablet 1    sertraline (ZOLOFT) 50 MG tablet Take 1 tablet by mouth daily 90 tablet 1    metoprolol tartrate (LOPRESSOR) 50 MG tablet Take 0.5 tablets by mouth 2 times daily 90 tablet 1    loxapine (LOXITANE) 5 MG capsule Take 1 capsule by mouth nightly 90 capsule 1    fluticasone (FLONASE) 50 MCG/ACT nasal spray 1 spray by Nasal route nightly 1 Bottle 0    acetaminophen (TYLENOL) 500 MG tablet Take 1,000 mg by mouth every 6 hours as needed for Pain      bisacodyl (DULCOLAX) 10 MG suppository Place 1 suppository rectally as needed for Constipation      docusate sodium (COLACE, DULCOLAX) 100 MG CAPS Take 100 mg by mouth 2 times daily      senna (SENOKOT) 8.6 MG tablet Take 2 tablets by mouth nightly 60 tablet 0    warfarin (COUMADIN) 1 MG tablet Take 1 tablet by mouth See Admin Instructions 90 tablet 1     No current facility-administered medications for this visit.       No Known Allergies  Health Maintenance   Topic Date Due    DTaP/Tdap/Td vaccine (1 - Tdap) 01/07/1951    Shingles Vaccine (1 of 2 - 2 Dose Series) 01/07/1982    Pneumococcal low/med risk (1 of 2 - PCV13) 01/07/1997    Potassium monitoring  02/08/2019    Creatinine monitoring  02/08/2019    Flu vaccine  Completed       Objective:  BP

## 2018-03-05 NOTE — CARE COORDINATION
[facility] Transitions Follow Up Call    3/5/2018    Patient: Thomas Hooks  Patient : 1932   MRN: [id number]  Reason for Admission: There are no discharge diagnoses documented for the most recent discharge. Discharge Date: 18 RARS: Risk Score: 19.5       Spoke with: [facility]    Care Transitions Subsequent and Final Call    Subsequent and Final Calls  Do you have any ongoing symptoms?:  No  Have your medications changed?:  No  Do you have any questions related to your medications?:  No  Do you currently have any active services?:  Yes  Are you currently active with any services?:  Home Health  Do you have any needs or concerns that I can assist you with?:  No  Identified Barriers:  None  Care Transitions Interventions  No Identified Needs  Other Interventions:        Called pt for the sub transition of care call. Pt denied any headache, nausea, vomiting, fever or cough. Pt stated she has been up with her walker.  Pt stated the Home health nurse is still coming to visit. Pt denied any needs or concerns. CTC will continue to follow.     Follow Up  Future Appointments  Date Time Provider Sanjuana Tyler   2018 8:30 AM Hemanth Hernandez MD [facility] - James Wang RN  Care Transition Coordinator  [phone]

## 2018-03-09 NOTE — CARE COORDINATION
[facility] Transitions Follow Up Call    3/9/2018    Patient: Linette Camara  Patient : 1932   MRN: [id number]  Reason for Admission: There are no discharge diagnoses documented for the most recent discharge. Discharge Date: 18 RARS: Risk Score: 19.5       Spoke with: [facility]    Care Transitions Subsequent and Final Call    Subsequent and Final Calls  Do you have any ongoing symptoms?:  No  Have your medications changed?:  No  Do you have any questions related to your medications?:  No  Do you currently have any active services?:  Yes  Are you currently active with any services?:  Home Health  Do you have any needs or concerns that I can assist you with?:  No  Identified Barriers:  None  Care Transitions Interventions  No Identified Needs  Other Interventions:        Called pt for the sub transition of care call.   Pt denied any headache, nausea, vomiting, fever or cough. Pt stated she no longer uses the walker, reported she walks \"like I am drunk\"  Encouraged to use the walker to prevent a fall, pt declined. Reminded to make sure no loose rugs to trip on & not to hurry. Pt denied any needs or concerns. CTC will continue to follow.     Follow Up  Future Appointments  Date Time Provider Sanjuana Tyler   2018 8:30 AM Jose Moran MD [facility] SHANE Lancaster RN  Care Transition Coordinator  [phone]

## 2018-03-12 NOTE — CARE COORDINATION
[facility] Transitions Follow Up Call    3/12/2018    Patient: Keshav Melendez  Patient : 1932   MRN: [id number]  Reason for Admission: There are no discharge diagnoses documented for the most recent discharge. Discharge Date: 18 RARS: Risk Score: 19.5       Spoke with: [facility]    Care Transitions Subsequent and Final Call    Subsequent and Final Calls  Do you have any ongoing symptoms?:  No  Have your medications changed?:  No  Do you have any questions related to your medications?:  No  Do you currently have any active services?:  Yes  Are you currently active with any services?:  Home Health  Do you have any needs or concerns that I can assist you with?:  No  Identified Barriers:  None  Care Transitions Interventions  No Identified Needs  Other Interventions:        Called pt for the final transition call. Pt denied any headache, nausea, vomiting, fever or cough. Pt denied any falls since going  Home, pt is ambulating without the walk. Pt denied any needs. Pt informed this is the final transition of care call. Instructed to call the primary care provider for any concerns. Please call during the regular office hours if possible, for urgent problems,  there is a provider on call. Call the office & follow the prompts. Call 911 for emergencies.     Follow Up  Future Appointments  Date Time Provider Sanjuana Tyler   2018 8:30 AM Shaun Tolentino MD [facility] DEL[facility] Cerrato RN  Care Transition Coordinator  [phone]

## 2018-03-13 PROBLEM — J69.0 ASPIRATION PNEUMONIA (HCC): Status: ACTIVE | Noted: 2018-01-01

## 2018-03-13 PROBLEM — G45.9 TIA (TRANSIENT ISCHEMIC ATTACK): Status: ACTIVE | Noted: 2018-01-01

## 2018-03-13 NOTE — PROGRESS NOTES
Constipation, Disp: , Rfl:     amLODIPine (NORVASC) 5 MG tablet, Take 1 tablet by mouth daily, Disp: 90 tablet, Rfl: 1    benazepril (LOTENSIN) 40 MG tablet, Take 1 tablet by mouth daily, Disp: 90 tablet, Rfl: 1    sertraline (ZOLOFT) 50 MG tablet, Take 1 tablet by mouth daily, Disp: 90 tablet, Rfl: 1    metoprolol tartrate (LOPRESSOR) 50 MG tablet, Take 0.5 tablets by mouth 2 times daily, Disp: 90 tablet, Rfl: 1    loxapine (LOXITANE) 5 MG capsule, Take 1 capsule by mouth nightly, Disp: 90 capsule, Rfl: 1    warfarin (COUMADIN) 1 MG tablet, Take 1 tablet by mouth See Admin Instructions, Disp: 90 tablet, Rfl: 1    fluticasone (FLONASE) 50 MCG/ACT nasal spray, 1 spray by Nasal route nightly, Disp: 1 Bottle, Rfl: 0    acetaminophen (TYLENOL) 500 MG tablet, Take 1,000 mg by mouth every 6 hours as needed for Pain, Disp: , Rfl:     No Known Allergies    Subjective:      Review of Systems   Constitutional: Negative for chills and fever. Eyes: Negative for discharge and redness. Respiratory: Negative for chest tightness and shortness of breath. Cardiovascular: Negative for chest pain and palpitations. Gastrointestinal: Positive for nausea and vomiting. Endocrine: Negative for cold intolerance and heat intolerance. Genitourinary: Negative for difficulty urinating and dysuria. Skin: Negative for color change and pallor. Allergic/Immunologic: Positive for environmental allergies. Negative for food allergies. Neurological: Positive for weakness, light-headedness and headaches. Hematological: Negative for adenopathy. Bruises/bleeds easily. Psychiatric/Behavioral: Positive for dysphoric mood. The patient is nervous/anxious. Objective:     BP 96/60 (Site: Left Arm, Position: Sitting, Cuff Size: Large Adult)   Pulse 70   Temp 97.9 °F (36.6 °C)   Ht 5' 4\" (1.626 m)   Wt 154 lb (69.9 kg)   BMI 26.43 kg/m²     Physical Exam   Constitutional: She is oriented to person, place, and time.  She appears well-developed. Emesis down patient's coat. HENT:   Head: Normocephalic and atraumatic. Nose: Nose normal.   Eyes: Conjunctivae and EOM are normal.   Neck: Normal range of motion. Neck supple. Cardiovascular: Normal rate and regular rhythm. Pulmonary/Chest: Effort normal and breath sounds normal. No respiratory distress. She has no wheezes. Abdominal: Soft. Bowel sounds are normal. She exhibits no distension. There is no tenderness. Musculoskeletal:   Patient in wheelchair. Neurological: She is alert and oriented to person, place, and time. She exhibits abnormal muscle tone (decreased). Coordination abnormal.   Left sided facial droop. Left arm droop. Skin: Skin is warm and dry. No rash noted. No erythema. Psychiatric: She has a normal mood and affect. Her behavior is normal.   Vitals reviewed. Assessment/Plan:     Ina Isidro was seen today for headache. Diagnoses and all orders for this visit:    Suspected cerebrovascular accident (CVA) ([facility] [address]    Acute nonintractable headache, unspecified headache type    Facial droop    Acute left-sided muscle weakness    81 yo F with complex medical history including atrial fibrillation and hypertension who presents today with new-onset severe headache, nausea/vomiting, facial droop, and acute left-sided weakness concerning for acute CVA. Patient in wheelchair saying \"I just don't feel right. .. I just don't feel right\". EMS was contacted and patient transported to hospital for further evaluation and treatment. Return for hospital follow-up.     Electronically signed by Vicki Kamara MD on 3/13/2018 at 3:26 PM

## 2018-03-13 NOTE — PROGRESS NOTES
Pharmacy Code Sepsis Evaluation    Blood cultures drawn at 17:27, 17:42  Antibiotics ordered: cefepime + vancomycin  Fluids 30mL/kg ordered: No, SBP > 90 and lactic acid < 4 at this time      WBC 17.8  /85  HR 98  Tmax 102.4F  Lactic Acid 2.5  Source pneumonia  Organ Dysfunction lactic acid elevated    Alexa Menchaca, PharmD 3/13/2018  7:08 PM

## 2018-03-13 NOTE — ED PROVIDER NOTES
medical history of Allergic rhinitis; Atrial fibrillation ([facility] [address] Blood circulation, collateral; Depression; Headache; Hyperlipidemia; Hypertension; Intermittent atrial fibrillation ([facility] [address] MID (multi infarct dementia); Pneumonia; Pneumonia of right lower lobe due to infectious organism [facility]); and Squamous cell cancer of skin of right cheek. SURGICAL HISTORY      has a past surgical history that includes Facial reconstruction surgery (2012); Tonsillectomy (as a child ); Cholecystectomy (6-24-14); and Hip fracture surgery (June 2014). CURRENT MEDICATIONS       Current Discharge Medication List      CONTINUE these medications which have NOT CHANGED    Details   !! warfarin (COUMADIN) 2 MG tablet Take 1 tablet by mouth See Admin Instructions  Qty: 90 tablet, Refills: 1    Associated Diagnoses: Paroxysmal atrial fibrillation (HCC)      magnesium hydroxide (MILK OF MAGNESIA) 400 MG/5ML suspension Take 30 mLs by mouth daily as needed for Constipation      amLODIPine (NORVASC) 5 MG tablet Take 1 tablet by mouth daily  Qty: 90 tablet, Refills: 1    Associated Diagnoses: Essential hypertension      benazepril (LOTENSIN) 40 MG tablet Take 1 tablet by mouth daily  Qty: 90 tablet, Refills: 1    Associated Diagnoses: Essential hypertension      sertraline (ZOLOFT) 50 MG tablet Take 1 tablet by mouth daily  Qty: 90 tablet, Refills: 1    Associated Diagnoses: Recurrent major depressive disorder, in full remission (HCC)      metoprolol tartrate (LOPRESSOR) 50 MG tablet Take 0.5 tablets by mouth 2 times daily  Qty: 90 tablet, Refills: 1    Associated Diagnoses: Essential hypertension      loxapine (LOXITANE) 5 MG capsule Take 1 capsule by mouth nightly  Qty: 90 capsule, Refills: 1    Associated Diagnoses: Recurrent major depressive disorder, in full remission ([facility] [address]  Mid (multi infarct dementia), without behavioral disturbance      !! warfarin (COUMADIN) 1 MG tablet Take 1 tablet by mouth See Admin Instructions  Qty: 90 tablet, Refills: 1    Associated Diagnoses: Paroxysmal atrial fibrillation (HCC)      fluticasone (FLONASE) 50 MCG/ACT nasal spray 1 spray by Nasal route nightly  Qty: 1 Bottle, Refills: 0    Associated Diagnoses: Seasonal allergic rhinitis, unspecified allergic rhinitis trigger      acetaminophen (TYLENOL) 500 MG tablet Take 1,000 mg by mouth every 6 hours as needed for Pain       !! - Potential duplicate medications found. Please discuss with provider. ALLERGIES     has No Known Allergies. FAMILY HISTORY     indicated that her mother is . She indicated that her father is . She indicated that all of her three sisters are . She indicated that two of her three brothers are alive. family history includes Cancer in her mother and sister; Heart Disease in her brother, brother, sister, sister, and sister. SOCIAL HISTORY      reports that she has never smoked. She has never used smokeless tobacco. She reports that she does not drink alcohol or use drugs. PHYSICAL EXAM     INITIAL VITALS:  height is 5' 3\" (1.6 m) and weight is 140 lb (63.5 kg). Her oral temperature is 97.5 °F (36.4 °C). Her blood pressure is 108/66 and her pulse is 94. Her respiration is 24 and oxygen saturation is 93%. Physical Exam   Constitutional: She is oriented to person, place, and time. She appears well-developed and well-nourished. HENT:   Head: Normocephalic and atraumatic. Right Ear: External ear normal.   Left Ear: External ear normal.   Eyes: Conjunctivae are normal. Right eye exhibits no discharge. Left eye exhibits no discharge. No scleral icterus. Neck: Normal range of motion. Neck supple. No JVD present. Cardiovascular: Normal rate, regular rhythm and normal heart sounds. Exam reveals no gallop and no friction rub. No murmur heard. Pulmonary/Chest: Effort normal and breath sounds normal. No stridor. No respiratory distress. She has no decreased breath sounds. She has no wheezes. lobe concerning for pneumonia, or atelectasis. Aspiration can also be considered in the setting of intractable vomiting. Correlation advised      **This report has been created using voice recognition software. It may contain minor errors which are inherent in voice recognition technology. **      Final report electronically signed by Dr. Mian Riley on 3/13/2018 7:08 PM      XR CHEST PORTABLE   Final Result      Airspace opacity in the right lung. Correlate for pneumonia or aspiration. **This report has been created using voice recognition software. It may contain minor errors which are inherent in voice recognition technology. **      Final report electronically signed by Dr. Mian Riley on 3/13/2018 5:36 PM      CT HEAD WO CONTRAST (CODE STROKE)   Final Result   Stable CT brain remote right temporal infarct and chronic small vessel ischemic disease. Possible chronic otomastoiditis on the right. **This report has been created using voice recognition software. It may contain minor errors which are inherent in voice recognition technology. **      Final report electronically signed by Dr. Lynnette Asher on 3/13/2018 4:11 PM      MRI brain without contrast    (Results Pending)       LABS:   Labs Reviewed   PROTIME-INR - Abnormal; Notable for the following:        Result Value    INR 1.63 (*)     All other components within normal limits   CBC WITH AUTO DIFFERENTIAL - Abnormal; Notable for the following:     WBC 17.8 (*)     RDW 14.9 (*)     Segs Absolute 16.7 (*)     Lymphocytes # 0.4 (*)     All other components within normal limits   BASIC METABOLIC PANEL - Abnormal; Notable for the following:     Sodium 133 (*)     Chloride 94 (*)     CO2 20 (*)     Glucose 191 (*)     All other components within normal limits   HEPATIC FUNCTION PANEL - Abnormal; Notable for the following:     Alkaline Phosphatase 151 (*)      (*)     ALT 92 (*)     All other components within normal limits   TROPONIN -

## 2018-03-14 NOTE — CONSULTS
cerebellar vermis. This can represent an arachnoid cyst this measures 2.3 x 2.1 cm. This is unchanged. The pituitary gland and brainstem are normal. There is fluid signal in the right mastoid air cell and middle ear cavity. 1. No evidence of an acute infarct. 2. Mild severity chronic small vessel ischemic changes. 3. Fluid signal in the right mastoid air cells and middle ear cavity. **This report has been created using voice recognition software. It may contain minor errors which are inherent in voice recognition technology. ** Final report electronically signed by Dr. David Ma on 3/14/2018 9:25 AM      Assessment and plan    Patient was admitted with a TIA and a pneumonia  Being managed as per primary care team.    Patient has a history of A. fib and she is on Coumadin for anticoagulation. Normal sinus rhythm at present. No further recommendation from cardiac standpoint. I think at this present time cardiac status is stable. Patient's family decided to go for optimal medical management. No need for any aggressive or invasive cardiac workup at this time.        Bernadette Saab MD, Rita Joshi, [facility]  Electronically signed 3/14/2018 at 11:29 PM

## 2018-03-14 NOTE — PROGRESS NOTES
lotensin with low BP -- cont lopressor with parameters with afib  14. Chronic anticoagulation for afib -- on coumadin -- INR subtherapeutic -- pharmacy to dose -- cardiology consulted  15. Constipation -- had last admission 2/2018 also -- CT noted on admission 3/13/18 with mod stool throughout colon and ?stool ball in rectum -- ??need suppository - ?enema - re-eval 3/15 - cont colace  7. HLP -- statin -- ?cont - not listed on home meds - lipids 3/14/18 normal - ?cont  8. Hx esophagitis   9. Depression -- cont zoloft, loxapine  10. Mild pulm HTN -- ?related to ?bronchiectatic changes -- O2 as needed - ? BD tx. 11. Grade 1 diastolic dysfxn -- per echo 2/6/18 - fluid status stable - cont monitor  12. 7 mm left splenic cyst  13. Mild/mod MR, mod/severe TR, mild NH -- per echo 2/6/2018  14. Dementia w/o behavioral disturbance -- mood stable - monitor  15. Cervical spinal stenosis -- symptomatic mgmt - per PCP note 3/2/18 no surgical mgmt  16. Generalized weakness -- PT/OT c/s -- SS c/s for d/c planning -- plan for ECF at d/c    DIspo  -- 3/14 --> awaiting neuro and cardio c/s -- cont zosyn for pneumonia - watch resp status closely with PNA -- c/s ST for swallow eval and ?aspiration. Cont monitor lytes, fluid status - stop norvasc, ACE with low BP and cont lopressor with parameters -- pharm dosing coumadin. Await cardio recs for elevated trop (did not have last admission) - pt w/o cp, sob. Cont monitor u/o, monitor cx -- ?need vanc if fever or clinically worsens -- ?palliative care if declines. Cont monitor BP, lytes, n/v - monitor for stools - ?need suppository/enema -- c/s PT/OT -- SS to see to assist with d/c planning - ?need to return to ECF (was d/c to ECF last admission and had been back home to senior living apt).       Chief Complaint: left facial droop, weakness drowsy, lethargic    Hospital Course: 80 y.o. female with parox afib/flutter on chronic coumadin, HTN, dementia, HLP, depression, recurrent headaches, n/v with recent admission 2/3/18 - 2/8/18 with HA, n/v and pneumonia who presented to [facility] with left facial droop. Per EMR, the patient was experiencing emesis and a headache this morning , and they began to notice a left sided facial droop and inability to form words around 2pm. Because of the facial droop, family called EMS. Patient has had 2 episodes similar to this in the past. She has a hx/o afib and is currently on Coumadin. Further WO did reveal possible RML PNA. -- BP low am 3/14 to 80's    Subjective:   -- 3/14/2018  --> pt lying flat in bed. Wakes. BP low in 80's this am and last pm.  Oriented to person only. Denies pain and states she is hungry. Denies cp, sob. Denies abd pain, n/v.  Denies a headache. Denies f/c. Still NPO. Afebrile. Ambulating without difficulty. No BM since admission.   +2.4L since admission      Medications:  Reviewed    Infusion Medications    sodium chloride 100 mL/hr at 03/14/18 0210     Scheduled Medications    warfarin  3 mg Oral Once    docusate sodium  100 mg Oral BID    fluticasone  1 spray Nasal Nightly    loxapine  5 mg Oral Nightly    metoprolol tartrate  25 mg Oral BID    sertraline  50 mg Oral Daily    sodium chloride flush  10 mL Intravenous 2 times per day    enoxaparin  40 mg Subcutaneous Daily    aspirin  81 mg Oral Daily    simvastatin  20 mg Oral Nightly    piperacillin-tazobactam  3.375 g Intravenous Q8H    warfarin (COUMADIN) daily dosing (placeholder)   Other RX Placeholder     PRN Meds: butalbital-acetaminophen-caffeine, sodium chloride flush, acetaminophen, magnesium hydroxide, ondansetron      Intake/Output Summary (Last 24 hours) at 03/14/18 1235  Last data filed at 03/14/18 0325   Gross per 24 hour   Intake          2481.26 ml   Output                0 ml   Net          2481.26 ml       Diet:       Exam:  /68   Pulse 94   Temp 97.9 °F (36.6 °C) (Axillary)   Resp 20   Ht 5' 3\" (1.6 m)   Wt 168 lb 4.8 oz Fluid signal in the right mastoid air cells and middle ear cavity. **This report has been created using voice recognition software. It may contain minor errors which are inherent in voice recognition technology. **      Final report electronically signed by Dr. Renato Collins on 3/14/2018 9:25 AM      CT ABDOMEN PELVIS W IV CONTRAST   Final Result         1. Moderate stool throughout the colon. Correlate for constipation there is a stool ball in the rectum. 2. Consolidation in the right middle lobe concerning for pneumonia, or atelectasis. Aspiration can also be considered in the setting of intractable vomiting. Correlation advised      **This report has been created using voice recognition software. It may contain minor errors which are inherent in voice recognition technology. **      Final report electronically signed by Dr. Aretha Navarrete on 3/13/2018 7:08 PM      XR CHEST PORTABLE   Final Result      Airspace opacity in the right lung. Correlate for pneumonia or aspiration. **This report has been created using voice recognition software. It may contain minor errors which are inherent in voice recognition technology. **      Final report electronically signed by Dr. Aretha Navarrete on 3/13/2018 5:36 PM      CT HEAD WO CONTRAST (CODE STROKE)   Final Result   Stable CT brain remote right temporal infarct and chronic small vessel ischemic disease. Possible chronic otomastoiditis on the right. **This report has been created using voice recognition software. It may contain minor errors which are inherent in voice recognition technology. **      Final report electronically signed by Dr. Mary Lanier on 3/13/2018 4:11 PM          Diet:      [place]: yes - placed 3/13    Microbiology:      Tele:  afib - rate controlled    DVT prophylaxis: [x] Lovenox                                 [] SCDs                                 [] SQ Heparin                                 [] Encourage ambulation

## 2018-03-14 NOTE — PROGRESS NOTES
with transfers and ambulation. Patient also had fatigue and SOB with ambulation. Patient required MOD for bed mobility and required additoinal time to sit EOB. Due to patient's impulsivity and poor safety awareness, she is at a higher risk of falls. Patient would benefit from continued therapy to improve safety, balance, strength and endurance with all mobility to maximize her functional potential.   Prognosis: Good    Clinical Presentation: Moderate - Evolving with Changing Characteristics: Patient tolerated session fairly, limited by cognition and fatigue. Patient had confusion throughout session and was impulsive with transfers and ambulation. Patient also had fatigue and SOB with ambulation. Patient required MOD for bed mobility and required additoinal time to sit EOB. Due to patient's impulsivity and poor safety awareness, she is at a higher risk of falls. Patient would benefit from continued therapy to improve safety, balance, strength and endurance with all mobility to maximize her functional potential.     Decision Making: High Complexitybased on patient assessment and decision making process of determining plan of care and establishing reasonable expectations for measurable functional outcomes    REQUIRES PT FOLLOW UP: Yes  Discharge Recommendations: Continue to assess pending progress, ECF with PT    Patient Education:  Patient Education: POC, safety and sequencing     Equipment Recommendations:  Equipment Needed: No    Safety:  Type of devices: All fall risk precautions in place, Call light within reach, Chair alarm in place, Gait belt, Patient at risk for falls, Left in chair, Nurse notified (chair alarm without batteries, RN notified. Nursing staff working on replacing batteries.  patient sleeping in bedside chair at completion of session)  Restraints  Initially in place: No    Plan:  Times per week: 5x N  Times per day: Daily  Specific instructions for Next Treatment: bed mobilty, transfers, gait, BLE strengthening, balance  Current Treatment Recommendations: Strengthening, ROM, Balance Training, Functional Mobility Training, Transfer Training, Stair training, Gait Training, Endurance Training, Home Exercise Program, Safety Education & Training, Patient/Caregiver Education & Training    Goals:  Patient goals : to go home  Short term goals  Time Frame for Short term goals: 2 weeks  Short term goal 1: Patient will perform bed mobility SHAAN to sit EOB  Short term goal 2: Patient will perform functional transfers SHAAN to sit in bedside chair  Short term goal 3: Patient will ambulate 95 feet with RW and SBA to walk around unit safely  Short term goal 4: Patient will perform functional standing activities with Good balance to decrease risk of future falls  Long term goals  Time Frame for Long term goals : NA due to short ELOS     Evaluation Complexity: Based on the findings of patient history, examination, clinical presentation, and decision making during this evaluation, the evaluation of James De La Fuente  is of medium complexity. PT G-Codes  Functional Limitation: Mobility: Walking and moving around  Mobility: Walking and Moving Around Current Status (): At least 40 percent but less than 60 percent impaired, limited or restricted  Mobility: Walking and Moving Around Goal Status ():  At least 20 percent but less than 40 percent impaired, limited or restricted       AM-PAC Inpatient Mobility without Stair Climbing Raw Score : 14  AM-PAC Inpatient without Stair Climbing T-Scale Score : 40.85  Mobility Inpatient CMS 0-100% Score: 53.33  Mobility Inpatient without Stair CMS G-Code Modifier : CK

## 2018-03-14 NOTE — PROGRESS NOTES
Admit RN called pt's son & dtr in law to review and update pt's home med list.  They report that Dr Lyly Martinez office ( Dr Mariela Pate) is managing coumadin therapy. Pt did take morning meds but hasnt had coumadin today yet. Normally, pt manages her own medications.

## 2018-03-14 NOTE — PROGRESS NOTES
detail.      Electronically signed by Raymond Valero RD, SANDY on 3/14/18 at 10:18 AM    Contact Number: [phone]

## 2018-03-14 NOTE — PROGRESS NOTES
Manager rounds complete on patient. Patient board is updated with pain goal and medication. Patient unable to answer questions about plan but room is clean and hourly rounding is complete.

## 2018-03-14 NOTE — PROGRESS NOTES
[address] [facility] [address]  PHYSICAL THERAPY MISSED TREATMENT NOTE  ACUTE CARE    Date: 3/14/2018  Patient Name: Serg Avelar        MRN: [id number]   : 1932  (80 y.o.)  Gender: female         REASON FOR MISSED TREATMENT:  Hold treatment per nursing request.  Per RN, patient's blood pressure is 88/64. Hold this am. Will check back this afternoon or as BP improves.

## 2018-03-14 NOTE — H&P
tomorrow  -statin added  -neurology eval for further recommendations    3. Hx/o PAF  -HR controlled  -INR subtherapeutic, pharmacy consulted for Coumadin dosage        Thank you Teresa Mcbride MD for the opportunity to be involved in this patient's care.     Electronically signed by Tejas Amezquita MD on 3/13/2018 at 8:37 PM
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
## 2018-03-15 NOTE — PLAN OF CARE
Problem: Impaired respiratory status  Goal: Clear lung sounds  Clear lung sounds    Outcome: Ongoing  Improve breath sounds, increase aeration, and decrease WOB.
appropriately. Pedal push and pull equal bilaterally. Hand grasp equal bilaterally. Neuro checks q4hrs. Will continue to monitor and assess. Ongoing until discharge.           Problem: Discharge Planning:  Goal: Patients continuum of care needs are met  Patients continuum of care needs are met  Outcome: Ongoing  Discharge planning in process and discussed with patient/family. Social work consulted for any additional needs. Care manager aware of discharge needs. Will continue to monitor.     Comments: Care plan reviewed with patient and family.   Patient and family verbalize understanding of the plan of care and contribute to goal setting.

## 2018-03-15 NOTE — PROGRESS NOTES
aid       Pain:  Pain Assessment  Patient Currently in Pain: Denies     Social/Functional:  Lives With: Alone  Type of Home: Apartment  Home Layout: One level  Home Access: Level entry  Home Equipment: Rolling walker (pt reports she was using a RW and just got well enough to no longer use it)     Bathroom Shower/Tub: Tub/Shower unit  Bathroom Toilet: Standard  Bathroom Equipment: Grab bars in shower, Shower chair  Bathroom Accessibility: Accessible    Receives Help From: Family  ADL Assistance: Needs assistance  Bath: Contact guard assistance (of daughter in law)  Dressing: Modified independent (per pt)  Toileting: Independent (per pt)  Homemaking Assistance:  (receives MOW however makes own breakfast and has to make own supper on tuesdays, pt reports doing own cleaning and laundry)     Ambulation Assistance: Independent  Transfer Assistance: Independent    Active : No  Additional Comments: Pt states that she lives alone in [facility]; questionable historian as this is different per chart review. Objective        Sensation  Overall Sensation Status: WFL          LUE AROM (degrees)  LUE AROM : WFL        RUE AROM (degrees)  RUE AROM : WFL       LUE Strength  L Shoulder Flex: 4-/5  L Elbow Flex: 4/5  L Hand Grasp: 4/5        RUE Strength  R Shoulder Flex: 4/5  R Elbow Flex: 4/5  R Hand Grasp: 4/5            ADL  LE Dressing:  (pt refused to doff socks and states that she never wears socks)  Toileting:  (dunbar catheter)     Bed mobility  Sit to Supine:  Moderate assistance (BLE into bed)  Scooting: Maximal assistance (x2 for boost in bed)    Transfers  Sit to stand: Minimal assistance  Stand to sit: Contact guard assistance    Balance  Sitting Balance: Contact guard assistance  Standing Balance: Contact guard assistance     Time: x60 seconds  Activity: prep for mobility     Functional Mobility  Functional - Mobility Device: Rolling Walker  Activity: To/from bathroom  Assist Level: Minimal assistance  Functional Mobility Comments: pt required cues for task completion and to continue mobility, pt with diffiuclty understanding basic instructions, pt required occassional assist to progress RW      Activity Tolerance:  Activity Tolerance: Patient limited by fatigue, Treatment limited secondary to decreased cognition  Activity Tolerance: Treatment initiated: Pt demonstrated decreased activity tolerance, requiring frequent standing and seated RBs during tasks. Pt required frequent redirection to task for completion. Pt refused all ADL tasks this date    Assessment:  Assessment: Pt would benefit from skilled OT intervention for above deficits and for return to PLOF along with transtion to the next level of care  Performance deficits / Impairments: Decreased functional mobility , Decreased ADL status, Decreased strength, Decreased safe awareness, Decreased balance, Decreased endurance  Prognosis: Guarded  Discharge Recommendations: Subacute/Skilled Nursing Facility, ECF with OT, Continue to assess pending progress, Patient would benefit from continued therapy after discharge    Clinical Decision Making: Clinical Decision making was of Moderate Complexity as the result of analysis of data from a detailed assessment, a consideration of several treatment options, the presence of comorbidities affecting the plan of care and the need for minimal to moderate modifications or assistance required to complete the evaluation. Patient Education:  Patient Education: Role of OT, POC and goals, safety, t/f training  Barriers to Learning: cognition    Equipment Recommendations:   Other: defer to next level of care    Safety:  Safety Devices in place: Yes  Type of devices: Left in bed, Nurse notified, All fall risk precautions in place, Bed alarm in place, Call light within reach, Chair alarm in place, Gait belt  Restraints  Initially in place: No    Plan:  Times per week: 6x  Current Treatment Recommendations: Strengthening, Balance Training, Functional Mobility Training, Endurance Training, Safety Education & Training, Self-Care / ADL, Patient/Caregiver Education & Training    Goals:  Patient goals : To get out of here    Short term goals  Time Frame for Short term goals: Two weeks  Short term goal 1: Pt to complete functional mobility at SBA and min VCs for safety for increased independence with toileting  Short term goal 2: Pt to perform light resistance UB exercises for increased act. tolerance required for ADL task completion  Short term goal 3: Pt to tolerate standing with 1 hand release for greater than 3 minutes at [address] for increased ease with grooming tasks  Long term goals  Time Frame for Long term goals : NA d/t short est. LOS    Evaluation Complexity: Based on the findings of patient history, examination, clinical presentation, and decision making during this evaluation, this patient is of medium complexity.     AM-PAC Inpatient Daily Activity Raw Score: 11  AM-PAC Inpatient ADL T-Scale Score : 29.04  ADL Inpatient CMS 0-100% Score: 70.42  ADL Inpatient CMS G-Code Modifier : CL

## 2018-03-15 NOTE — PROGRESS NOTES
[facility] NEUROSCIENCES 4A  Bedside Swallowing Evaluation      SLP Individual Minutes  Time In: 8151  Time Out: 5547  Minutes: 12  Timed Code Treatment Minutes: 0 Minutes       Date: 3/15/2018  Patient Name: Marlena Colin      CSN: [id number]   : 1932  (80 y.o.)  Gender: female   Referring Physician:  vEeline Elliott MD  Diagnosis: TIA  Secondary Diagnosis:  Dysphagia    History of Present Illness/Injury: Pt admitted to [facility] with above med dx. Please see physician H&P for full details. Pt presented via EMS due to L sided facial droop, slurred, speech, and lethargy; imaging with NO evidenced of an acute infarct. ST consulted to assess swallow function to establish safest PO diet level.    Past Medical History:   Diagnosis Date    Allergic rhinitis 2016    Atrial fibrillation ([facility] [address]     Blood circulation, collateral     Depression     Headache 2014    Hyperlipidemia 2015    Hypertension     Intermittent atrial fibrillation (HCC)     MID (multi infarct dementia) 2015    Pneumonia 2016    Pneumonia of right lower lobe due to infectious organism ([facility] [address] 2016    Squamous cell cancer of skin of right cheek 3/23/2015       Pain:  0/10    Current Diet: Regular with thin liquids    Respiratory Status: [] Independent [x] Nasal Cannula [] Oxygen Mask      [] Tracheostomy [] Other:     [] Ventilator/Settings:    Behavioral Observation: [x] Alert [] Oriented [] Confused [x] Lethargic      [] Dysarthric [] Limited Direction Following [] Agitated      [] Other:    ORAL MECHANISM EVALUATION:         Comments:  Facial / Labial [x]WFL [] Impaired []DNT    Lingual [x]WFL [] Impaired []DNT    Dentition []WFL [x] Impaired []DNT Upper partial; decaying lower natural dentition    Velum [x]WFL [] Impaired []DNT    Vocal Quality [x]WFL [] Impaired []DNT    Sensation [x]WFL [] Impaired []DNT    Cough [x]WFL [] Impaired []DNT        PATIENT WAS EVALUATED appropriate. RECOMMENDATIONS:     Modified Barium Swallow: [] Is indicated to further assess    [x] Is NOT indicated at this time; Will recommend as  appropriate. DIET RECOMMENDATIONS:  Regular with thin liquids    STRATEGIES: [] Strategies pending MBS results. [x] Full upright position  [x] Small bite/sip [] No Straw [] Multiple Swallow  [] Chin tuck [] Head turn [x] Pulmonary monitoring [] Oral care after all meals  [x] Supervision  [] Medication in applesauce []Direct 1:1 Supervision  [] Spoon all liquids [x] Alternate solid / liquid [x] Limit distractions [x] Monitor for fatigue  [] PMV in place for all po [] OTHER:      EDUCATION:   Learner: [x]Patient [] Significant other [] Son/Daughter [] Parent     [] Other:   Education: [x] Reviewed results and recommendations of this evaluation     [] Reviewed diet and strategies     [] Reviewed signs, symptoms and risk of aspiration     [] Demonstrated how to thick liquid appropriately. [x] Reviewed goals and Plan of Care     [] OTHER:   Method: [x] Discussion [] Demonstration [] Hand-out     [] OTHER:   Evaluation of Education:     [x] Verbalizes understanding [] Demonstrates with assistance     [] Demonstrates without assistance [x]Needs further instruction     [] No evidence of learning  [x] Family not present    PATIENT GOALS: [x] Pt did not state. Will further assess during treatment. [] Return to the least restricted diet possible     [] Return to previous level of function     [] OTHER:    PLAN / TREATMENT RECOMMENDATIONS:  [x] Skilled SLP intervention on acute care 3-5 x per week or until goals met and/or pt plateaus in function.   Specific interventions for next session may include: diet analysis, cognitive evaluation     SHORT TERM GOALS:  Short-term Goals  Timeframe for Short-term Goals: two weeks   Goal 1: Pt will safely tolerate regular with thin liquid diet given compensatory swallowing strategies to maximize nutrition/hydration

## 2018-03-16 NOTE — DISCHARGE SUMMARY
Intravenous Continuous Latasha Phelps  mL/hr at 03/15/18 2138           Patient Instructions:    Discharge lab work: none  Activity: bedrest  Diet:      Code Status: DNR-CC    Follow-up visits:   Debby Currie MD  [address] [facility],[address]  [address] [address],[address]  [address] [facility] [address] [facility] Catherine Samuel President, MD  [address] [place]  [phone]          CM [facility] [place] [facility] [place]  [address]  [address]  [phone]           Procedures: none    Consults:   PHARMACY TO DOSE VANCOMYCIN  IP CONSULT TO NEUROLOGY  IP CONSULT TO PHARMACY  IP CONSULT TO CARDIOLOGY  IP CONSULT TO SOCIAL WORK  IP CONSULT TO PHARMACY  PALLIATIVE CARE EVAL  IP CONSULT TO HOSPICE      Examination:  Vitals:  Vitals:    03/16/18 0613 03/16/18 0630 03/16/18 0636 03/16/18 0815   BP: (!) 126/91  (!) 139/98 101/66   Pulse:    104   Resp:  28  24   Temp:    99.6 °F (37.6 °C)   TempSrc:    Axillary   SpO2:    97%   Weight:       Height:         Weight: Weight: 169 lb 1.6 oz (76.7 kg)     24 hour intake/output:  Intake/Output Summary (Last 24 hours) at 03/16/18 1123  Last data filed at 03/16/18 0422   Gross per 24 hour   Intake          2706. 56 ml   Output             1250 ml   Net          1456.56 ml       General appearance - appears ill, wakes, responds to commands, weak, warm and clammy  Chest - diffuse rhonchi and wheeze, no distress at this time  Heart - irregularly irregular rhythm with rate tachy  Abdomen - soft, nontender, nondistended, no masses or organomegaly  bowel sounds normal  Obese: No; Protuberant: No   Neurological - alert, oriented, normal speech, no focal findings or movement disorder noted, cranial nerves II through XII intact  Extremities - no pedal edema noted, intact peripheral pulses  Skin - cool, clammy, some hand and perioral cyanosis     Significant Diagnostics:   Radiology:   XR CHEST PORTABLE   Final Result   1.   Increasing density in the left lung base representing either

## 2018-03-16 NOTE — PROGRESS NOTES
Accompanied patient to [facility] for hospice care. Opened eyes- alert to self. Soft spoken. Reports comfortable at this time. Noted congestion. Rhonchi heard throughout. Denied that SOB or pain.

## 2018-03-16 NOTE — PROGRESS NOTES
03/13/2018       Radiology:  XR CHEST PORTABLE   Final Result   1. Increasing density in the left lung base representing either atelectasis or pneumonia. 2.  Stable infiltrates involving the right lung consistent with pneumonia. 3.  Suspect at least small bilateral pleural effusions. **This report has been created using voice recognition software. It may contain minor errors which are inherent in voice recognition technology. **      Final report electronically signed by Dr. Perry Cortez on 3/16/2018 6:16 AM      XR CHEST PORTABLE   Final Result      Multifocal bilateral airspace opacities greatest in the right upper and right lower lobe. Small bilateral pleural effusions. Findings are concerning for pneumonia/aspiration. Correlation and follow-up advised. **This report has been created using voice recognition software. It may contain minor errors which are inherent in voice recognition technology. **      Final report electronically signed by Dr. Aretha Navarrete on 3/15/2018 8:04 PM      MRI brain without contrast   Final Result       1. No evidence of an acute infarct. 2. Mild severity chronic small vessel ischemic changes. 3. Fluid signal in the right mastoid air cells and middle ear cavity. **This report has been created using voice recognition software. It may contain minor errors which are inherent in voice recognition technology. **      Final report electronically signed by Dr. Renato Collins on 3/14/2018 9:25 AM      CT ABDOMEN PELVIS W IV CONTRAST   Final Result         1. Moderate stool throughout the colon. Correlate for constipation there is a stool ball in the rectum. 2. Consolidation in the right middle lobe concerning for pneumonia, or atelectasis. Aspiration can also be considered in the setting of intractable vomiting. Correlation advised      **This report has been created using voice recognition software.  It may contain minor errors which are inherent in voice recognition technology. **      Final report electronically signed by Dr. Jt Gross on 3/13/2018 7:08 PM      XR CHEST PORTABLE   Final Result      Airspace opacity in the right lung. Correlate for pneumonia or aspiration. **This report has been created using voice recognition software. It may contain minor errors which are inherent in voice recognition technology. **      Final report electronically signed by Dr. Jt Gross on 3/13/2018 5:36 PM      CT HEAD WO CONTRAST (CODE STROKE)   Final Result   Stable CT brain remote right temporal infarct and chronic small vessel ischemic disease. Possible chronic otomastoiditis on the right. **This report has been created using voice recognition software. It may contain minor errors which are inherent in voice recognition technology. **      Final report electronically signed by Dr. Marce Ag on 3/13/2018 4:11 PM          Diet:      DVT prophylaxis: [] Lovenox                                 [] SCDs                                 [] SQ Heparin                                 [] Encourage ambulation           [] Already on Anticoagulation     Disposition:    [] Home       [] TCU       [] Rehab       [] Psych       [x] SNF       [x] [place]       [] Other-    Code Status: Limited    PT/OT Eval Status:     Assessment/Plan:Acute hypoxic resp failure due to worsening asp PNA with afib RVR with secondary metab encephalopathy. ..becoming septic with lactate of 2.4      Anticipated Discharge in : few days    Active Hospital Problems    Diagnosis Date Noted    TIA (transient ischemic attack) [G45.9] 03/13/2018    Aspiration pneumonia ([facility] [address] [J69.0] 03/13/2018   Coagulopathy due to coumadin?  --prob sepsis  -Critically Ill    1. Back off diuresis--intially given with the impression of pul edmea--but seems to be more of wprsening PNA with still acute CHF diastolic likely  2. Gentle hydration/sepsis protocol;d/w RN  3.  On zosyn

## 2018-03-16 NOTE — PROGRESS NOTES
Pt admitted to  5K1 via direct admit and hospice from 4A. Complaints: hospice. IV normal saline infusing into the forearm left, condition patent and no redness at a rate of 20 mls/ hour with about 20 mls in the bag still. IV site free of s/s of infection or infiltration. Vital signs obtained. Assessment and data collection initiated. Oriented to room. All questions answered with no further questions at this time. Fall prevention and safety brochure discussed with patient. Leland Henderson.  Jessica Ser 3/16/2018 2:37 PM

## 2018-03-16 NOTE — PROGRESS NOTES
1750: Patient (pt) in bed stating she needs to go to bathroom to have bowel movement. Her color is somewhat dusky and she appears weaker than earlier today. Informed her that she needed to stay in bed and use bedpan. Pt placed on bedpan and had small to medium solid, brown bowel movement. Pt became somewhat more dusky after bowel movement. Heart rate elevated to 130's, afib. Charge nurse Tano Brown informed. 1758: Called Temitope from rapid response to check on pt. Patient is limited code (no x 4). Oxygen 88% on 3L/nc. Pt also having intermittent small amounts of emesis. Lung sounds wheezing on right and rhonchi on left side. Oxygen increased to 4l/nc. 1805: Dr Myesha Bello called and in to see patient. Patient suctioned and then vomited small amount of undigested food. Pulse ox 88-89%  1810: Patient's son called and updated on condition. Dr Myesha Bello then talked with patient's son at length. Family and patient want to continue with current code status. 1815: Non-rebreather placed on patient. Color somewhat better at this time. 1820: Vital signs stable. Will continue to monitor patient. Patient asking for help to eat.  Explained NPO status for now, d/t suspected aspiration of emesis after eating per Dr Myesha Bello.

## 2018-03-16 NOTE — PROGRESS NOTES
Patient oral medications held due to vomiting episodes on day shift and possible aspiration pneumonia. Speech to reevaluate in the morning. Will continue to monitor.

## 2018-03-16 NOTE — PROGRESS NOTES
Hospice referral completed in patient's room with son Faby Schultz and daughter-in-law Alisa Butler concepts, philosophies and services explained. Discussed qualification process as well as levels of care. Pt lying in bed with high flow NC on. Noted patient with respiratory distress earlier this morning with need for morphine IV 2mg for symptom management x2. Pt unresponsive. Diaphoretic. Talked with family that hospice is when focus is on comfort rather than aggressive measures. Both voice understanding and wish for comfort only for patient. Discussed removal of High flow with possible start of morphine infusion to be used for symptom management based off of patient need for comfort. Agreeable for this if patient would meet for criteria. Discussed with Dr. Marisol Bennett and Dr. Jaciel Abarca reviewed case. Both agree for GIP hospice level of care. Patient to transition to [place] [id number] for inpatient hospice care.

## 2018-03-16 NOTE — PROGRESS NOTES
[facility] THERAPY MISSED TREATMENT NOTE  STR NEUROSCIENCES 4A      Date: 3/16/2018  Patient Name: Isabel Vigil        MRN: [id number]    : 1932  (80 y.o.)    REASON FOR MISSED TREATMENT:  Attempted to see pt for provision of skilled ST services targeting dysphagia. BOBY Botello with request to hold services at this time; decline in medical stability with rapid response called at 0553; potential comfort care or hospice referral. Will follow-up with nursing prior to provision of further services.     Ellen Cody M.A., [address] [place] [address]

## 2018-03-19 LAB
BLOOD CULTURE, ROUTINE: NORMAL
BLOOD CULTURE, ROUTINE: NORMAL

## 2020-12-22 NOTE — PLAN OF CARE
Problem: Pain:  Goal: Pain level will decrease  Pain level will decrease    Outcome: Ongoing  Patient denies pain this shift. PRN fioricet and lidocaine available     Problem: Discharge Planning:  Goal: Participates in care planning  Participates in care planning   Outcome: Ongoing  Patient up to date with plan of care. Will monitor  Goal: Discharged to appropriate level of care  Discharged to appropriate level of care   Outcome: Ongoing  Will return home at discharge. Will monitor     Problem: Pain:  Goal: Pain level will decrease  Pain level will decrease    Outcome: Ongoing  Patient denies pain this shift. PRN fioricet and lidocaine available     Problem: Skin Integrity - Impaired:  Goal: Will show no infection signs and symptoms  Will show no infection signs and symptoms   Outcome: Ongoing  Running temps this shift. PRN tylenol administered. Will monitor  Goal: Absence of new skin breakdown  Absence of new skin breakdown   Outcome: Ongoing  No new breakdown. Turning every 2 hours. Will monitor     Problem: Risk for Impaired Skin Integrity  Goal: Tissue integrity - skin and mucous membranes  Structural intactness and normal physiological function of skin and  mucous membranes. Outcome: Ongoing  No breakdown. Turning every 2 hours. Will monitor     Problem: Falls - Risk of:  Goal: Will remain free from falls  Will remain free from falls   Outcome: Ongoing  No falls noted this shift. Continue falling star program. Bed alarm on, bed in low position. Call light and personal belongings in reach. Patient uses call light appropriately.     Problem: Neurological  Goal: Maximum potential motor/sensory/cognitive function  Outcome: Ongoing  A&Ox4 but has delayed responses and is weak and moves slow. Will monitor     Problem: Cardiovascular  Goal: Hemodynamic stability  Outcome: Ongoing  Vitals stable. Will monitor     Problem:   Goal: Adequate urinary output  Outcome: Ongoing  Patient has over 450 out this shift.  Anupama Was done for at least one hour